# Patient Record
Sex: FEMALE | Race: ASIAN | NOT HISPANIC OR LATINO
[De-identification: names, ages, dates, MRNs, and addresses within clinical notes are randomized per-mention and may not be internally consistent; named-entity substitution may affect disease eponyms.]

---

## 2023-10-04 ENCOUNTER — APPOINTMENT (OUTPATIENT)
Dept: OBGYN | Facility: CLINIC | Age: 28
End: 2023-10-04
Payer: MEDICAID

## 2023-10-04 VITALS
WEIGHT: 102 LBS | HEIGHT: 62 IN | DIASTOLIC BLOOD PRESSURE: 73 MMHG | BODY MASS INDEX: 18.77 KG/M2 | SYSTOLIC BLOOD PRESSURE: 109 MMHG

## 2023-10-04 DIAGNOSIS — Z01.419 ENCOUNTER FOR GYNECOLOGICAL EXAMINATION (GENERAL) (ROUTINE) W/OUT ABNORMAL FINDINGS: ICD-10-CM

## 2023-10-04 DIAGNOSIS — N91.2 AMENORRHEA, UNSPECIFIED: ICD-10-CM

## 2023-10-04 PROBLEM — Z00.00 ENCOUNTER FOR PREVENTIVE HEALTH EXAMINATION: Status: ACTIVE | Noted: 2023-10-04

## 2023-10-04 PROCEDURE — 99385 PREV VISIT NEW AGE 18-39: CPT

## 2023-10-04 RX ORDER — ASCORBIC ACID, CHOLECALCIFEROL, .ALPHA.-TOCOPHEROL ACETATE, DL-, PYRIDOXINE, FOLIC ACID, CYANOCOBALAMIN, CALCIUM, FERROUS FUMARATE, MAGNESIUM, DOCONEXENT 85; 200; 10; 25; 1; 12; 140; 27; 45; 300 [IU]/1; [IU]/1; [IU]/1; [IU]/1; MG/1; UG/1; MG/1; MG/1; MG/1; MG/1
27-0.6-0.4-3 CAPSULE, GELATIN COATED ORAL
Qty: 60 | Refills: 3 | Status: ACTIVE | COMMUNITY
Start: 2023-10-04 | End: 1900-01-01

## 2023-10-04 RX ORDER — DOXYLAMINE SUCCINATE AND PYRIDOXINE HYDROCHLORIDE 10; 10 MG/1; MG/1
10-10 TABLET, DELAYED RELEASE ORAL
Qty: 30 | Refills: 2 | Status: ACTIVE | COMMUNITY
Start: 2023-10-04 | End: 1900-01-01

## 2023-10-08 LAB
ABO + RH PNL BLD: NORMAL
APPEARANCE: CLEAR
BACTERIA UR CULT: NORMAL
BACTERIA: NEGATIVE /HPF
BASOPHILS # BLD AUTO: 0.08 K/UL
BASOPHILS NFR BLD AUTO: 0.7 %
BILIRUBIN URINE: NEGATIVE
BLD GP AB SCN SERPL QL: NORMAL
BLOOD URINE: NEGATIVE
C TRACH RRNA SPEC QL NAA+PROBE: NOT DETECTED
CAST: 0 /LPF
COLOR: YELLOW
EOSINOPHIL # BLD AUTO: 0.32 K/UL
EOSINOPHIL NFR BLD AUTO: 2.9 %
EPITHELIAL CELLS: 9 /HPF
ESTIMATED AVERAGE GLUCOSE: 103 MG/DL
GLUCOSE QUALITATIVE U: NEGATIVE MG/DL
GLUCOSE SERPL-MCNC: 65 MG/DL
HBA1C MFR BLD HPLC: 5.2 %
HBV SURFACE AG SER QL: NONREACTIVE
HCT VFR BLD CALC: 35.4 %
HCV AB SER QL: NONREACTIVE
HCV S/CO RATIO: 0.06 S/CO
HGB A MFR BLD: 97.6 %
HGB A2 MFR BLD: 2.4 %
HGB BLD-MCNC: 11.1 G/DL
HGB FRACT BLD-IMP: NORMAL
HIV1+2 AB SPEC QL IA.RAPID: NONREACTIVE
HPV 16 E6+E7 MRNA CVX QL NAA+PROBE: NOT DETECTED
HPV HIGH+LOW RISK DNA PNL CVX: NOT DETECTED
HPV18+45 E6+E7 MRNA CVX QL NAA+PROBE: NOT DETECTED
IMM GRANULOCYTES NFR BLD AUTO: 0.4 %
KETONES URINE: NEGATIVE MG/DL
LEAD BLD-MCNC: 1 UG/DL
LEUKOCYTE ESTERASE URINE: ABNORMAL
LYMPHOCYTES # BLD AUTO: 2.62 K/UL
LYMPHOCYTES NFR BLD AUTO: 24.1 %
M TB IFN-G BLD-IMP: NEGATIVE
MAN DIFF?: NORMAL
MCHC RBC-ENTMCNC: 27.7 PG
MCHC RBC-ENTMCNC: 31.4 GM/DL
MCV RBC AUTO: 88.3 FL
MEV IGG FLD QL IA: >300 AU/ML
MEV IGG+IGM SER-IMP: POSITIVE
MICROSCOPIC-UA: NORMAL
MONOCYTES # BLD AUTO: 0.95 K/UL
MONOCYTES NFR BLD AUTO: 8.7 %
MUV AB SER-ACNC: NEGATIVE
MUV IGG SER QL IA: 7.2 AU/ML
N GONORRHOEA RRNA SPEC QL NAA+PROBE: NOT DETECTED
NEUTROPHILS # BLD AUTO: 6.88 K/UL
NEUTROPHILS NFR BLD AUTO: 63.2 %
NITRITE URINE: NEGATIVE
PH URINE: 7
PLATELET # BLD AUTO: 304 K/UL
PROTEIN URINE: NEGATIVE MG/DL
QUANTIFERON TB PLUS MITOGEN MINUS NIL: >10 IU/ML
QUANTIFERON TB PLUS NIL: 0.03 IU/ML
QUANTIFERON TB PLUS TB1 MINUS NIL: 0.01 IU/ML
QUANTIFERON TB PLUS TB2 MINUS NIL: 0 IU/ML
RBC # BLD: 4.01 M/UL
RBC # FLD: 13.5 %
RED BLOOD CELLS URINE: 0 /HPF
REVIEW: NORMAL
RUBV IGG FLD-ACNC: 3 INDEX
RUBV IGG SER-IMP: POSITIVE
SOURCE AMPLIFICATION: NORMAL
SPECIFIC GRAVITY URINE: 1.01
T PALLIDUM AB SER QL IA: NEGATIVE
T4 FREE SERPL-MCNC: 1.4 NG/DL
TSH SERPL-ACNC: 1.33 UIU/ML
UROBILINOGEN URINE: 0.2 MG/DL
WBC # FLD AUTO: 10.89 K/UL
WHITE BLOOD CELLS URINE: 1 /HPF

## 2023-10-10 LAB
AR GENE MUT ANL BLD/T: NORMAL
CFTR MUT TESTED BLD/T: NEGATIVE
CYTOLOGY CVX/VAG DOC THIN PREP: ABNORMAL

## 2023-10-12 LAB — FMR1 GENE MUT ANL BLD/T: NORMAL

## 2023-10-18 ENCOUNTER — APPOINTMENT (OUTPATIENT)
Dept: ANTEPARTUM | Facility: CLINIC | Age: 28
End: 2023-10-18
Payer: MEDICAID

## 2023-10-18 ENCOUNTER — ASOB RESULT (OUTPATIENT)
Age: 28
End: 2023-10-18

## 2023-10-18 ENCOUNTER — APPOINTMENT (OUTPATIENT)
Dept: OBGYN | Facility: CLINIC | Age: 28
End: 2023-10-18
Payer: MEDICAID

## 2023-10-18 VITALS
DIASTOLIC BLOOD PRESSURE: 72 MMHG | BODY MASS INDEX: 18.58 KG/M2 | HEIGHT: 62 IN | SYSTOLIC BLOOD PRESSURE: 109 MMHG | WEIGHT: 101 LBS

## 2023-10-18 PROCEDURE — 76815 OB US LIMITED FETUS(S): CPT

## 2023-10-18 PROCEDURE — 76801 OB US < 14 WKS SINGLE FETUS: CPT

## 2023-10-18 PROCEDURE — 0502F SUBSEQUENT PRENATAL CARE: CPT

## 2023-11-15 ENCOUNTER — APPOINTMENT (OUTPATIENT)
Dept: OBGYN | Facility: CLINIC | Age: 28
End: 2023-11-15

## 2023-11-15 ENCOUNTER — APPOINTMENT (OUTPATIENT)
Dept: ANTEPARTUM | Facility: CLINIC | Age: 28
End: 2023-11-15

## 2023-11-16 ENCOUNTER — APPOINTMENT (OUTPATIENT)
Dept: ANTEPARTUM | Facility: CLINIC | Age: 28
End: 2023-11-16
Payer: MEDICAID

## 2023-11-16 ENCOUNTER — LABORATORY RESULT (OUTPATIENT)
Age: 28
End: 2023-11-16

## 2023-11-16 ENCOUNTER — ASOB RESULT (OUTPATIENT)
Age: 28
End: 2023-11-16

## 2023-11-16 PROCEDURE — 76813 OB US NUCHAL MEAS 1 GEST: CPT

## 2023-11-16 PROCEDURE — 76801 OB US < 14 WKS SINGLE FETUS: CPT | Mod: 59

## 2023-11-27 ENCOUNTER — NON-APPOINTMENT (OUTPATIENT)
Age: 28
End: 2023-11-27

## 2023-11-30 ENCOUNTER — TRANSCRIPTION ENCOUNTER (OUTPATIENT)
Age: 28
End: 2023-11-30

## 2023-12-06 ENCOUNTER — APPOINTMENT (OUTPATIENT)
Dept: OBGYN | Facility: CLINIC | Age: 28
End: 2023-12-06
Payer: MEDICAID

## 2023-12-06 VITALS — SYSTOLIC BLOOD PRESSURE: 111 MMHG | BODY MASS INDEX: 19.2 KG/M2 | DIASTOLIC BLOOD PRESSURE: 75 MMHG | WEIGHT: 105 LBS

## 2023-12-06 DIAGNOSIS — Z3A.16 16 WEEKS GESTATION OF PREGNANCY: ICD-10-CM

## 2023-12-06 PROCEDURE — 0502F SUBSEQUENT PRENATAL CARE: CPT

## 2023-12-07 LAB
APPEARANCE: CLEAR
BACTERIA: NEGATIVE /HPF
BILIRUBIN URINE: NEGATIVE
BLOOD URINE: NEGATIVE
CAST: 0 /LPF
COLOR: YELLOW
EPITHELIAL CELLS: 2 /HPF
GLUCOSE QUALITATIVE U: NEGATIVE MG/DL
KETONES URINE: NEGATIVE MG/DL
LEUKOCYTE ESTERASE URINE: ABNORMAL
MICROSCOPIC-UA: NORMAL
NITRITE URINE: NEGATIVE
PH URINE: 6.5
PROTEIN URINE: NEGATIVE MG/DL
RED BLOOD CELLS URINE: 0 /HPF
SPECIFIC GRAVITY URINE: 1
UROBILINOGEN URINE: 0.2 MG/DL
WHITE BLOOD CELLS URINE: 3 /HPF

## 2023-12-09 LAB — BACTERIA UR CULT: NORMAL

## 2023-12-11 LAB
CHROMOSOME13 INTERPRETATION: NORMAL
CHROMOSOME13 TEST RESULT: NORMAL
CHROMOSOME18 INTERPRETATION: NORMAL
CHROMOSOME18 TEST RESULT: NORMAL
CHROMOSOME21 INTERPRETATION: NORMAL
CHROMOSOME21 TEST RESULT: NORMAL
FETAL FRACTION: NORMAL
PERFORMANCE AND LIMITATIONS: NORMAL
SEX CHROMOSOME INTERPRETATION: NORMAL
SEX CHROMOSOME TEST RESULT: NORMAL
VERIFI PRENATAL TEST: NOT DETECTED

## 2023-12-12 LAB
AFP MOM: 1.77
AFP VALUE: 82.7 NG/ML
ALPHA FETOPROTEIN SERUM COMMENT: NORMAL
ALPHA FETOPROTEIN SERUM INTERPRETATION: NORMAL
ALPHA FETOPROTEIN SERUM RESULTS: NORMAL
ALPHA FETOPROTEIN SERUM TEST RESULTS: NORMAL
GESTATIONAL AGE BASED ON: NORMAL
GESTATIONAL AGE ON COLLECTION DATE: 16.6 WEEKS
INSULIN DEP DIABETES: NO
MATERNAL AGE AT EDD AFP: 29.3 YR
MULTIPLE GESTATION: NO
OSBR RISK 1 IN: 1373
RACE: NORMAL
WEIGHT AFP: 105 LBS

## 2023-12-27 ENCOUNTER — NON-APPOINTMENT (OUTPATIENT)
Age: 28
End: 2023-12-27

## 2023-12-28 ENCOUNTER — ASOB RESULT (OUTPATIENT)
Age: 28
End: 2023-12-28

## 2023-12-28 ENCOUNTER — APPOINTMENT (OUTPATIENT)
Dept: MATERNAL FETAL MEDICINE | Facility: CLINIC | Age: 28
End: 2023-12-28
Payer: MEDICAID

## 2023-12-28 PROCEDURE — 99202 OFFICE O/P NEW SF 15 MIN: CPT | Mod: 95

## 2024-01-02 ENCOUNTER — APPOINTMENT (OUTPATIENT)
Dept: ANTEPARTUM | Facility: CLINIC | Age: 29
End: 2024-01-02

## 2024-01-08 ENCOUNTER — APPOINTMENT (OUTPATIENT)
Dept: ANTEPARTUM | Facility: CLINIC | Age: 29
End: 2024-01-08
Payer: MEDICAID

## 2024-01-08 ENCOUNTER — ASOB RESULT (OUTPATIENT)
Age: 29
End: 2024-01-08

## 2024-01-08 ENCOUNTER — APPOINTMENT (OUTPATIENT)
Dept: OBGYN | Facility: CLINIC | Age: 29
End: 2024-01-08
Payer: COMMERCIAL

## 2024-01-08 VITALS — BODY MASS INDEX: 19.75 KG/M2 | SYSTOLIC BLOOD PRESSURE: 102 MMHG | WEIGHT: 108 LBS | DIASTOLIC BLOOD PRESSURE: 66 MMHG

## 2024-01-08 PROCEDURE — 0502F SUBSEQUENT PRENATAL CARE: CPT

## 2024-01-08 PROCEDURE — 76811 OB US DETAILED SNGL FETUS: CPT

## 2024-01-24 ENCOUNTER — LABORATORY RESULT (OUTPATIENT)
Age: 29
End: 2024-01-24

## 2024-01-24 ENCOUNTER — APPOINTMENT (OUTPATIENT)
Dept: OBGYN | Facility: CLINIC | Age: 29
End: 2024-01-24
Payer: COMMERCIAL

## 2024-01-24 ENCOUNTER — ASOB RESULT (OUTPATIENT)
Age: 29
End: 2024-01-24

## 2024-01-24 ENCOUNTER — APPOINTMENT (OUTPATIENT)
Dept: ANTEPARTUM | Facility: CLINIC | Age: 29
End: 2024-01-24
Payer: MEDICAID

## 2024-01-24 VITALS — SYSTOLIC BLOOD PRESSURE: 111 MMHG | WEIGHT: 110 LBS | DIASTOLIC BLOOD PRESSURE: 80 MMHG | BODY MASS INDEX: 20.12 KG/M2

## 2024-01-24 PROCEDURE — ZZZZZ: CPT

## 2024-01-24 PROCEDURE — 76816 OB US FOLLOW-UP PER FETUS: CPT

## 2024-01-24 PROCEDURE — 59000 AMNIOCENTESIS DIAGNOSTIC: CPT

## 2024-02-05 ENCOUNTER — APPOINTMENT (OUTPATIENT)
Dept: OBGYN | Facility: CLINIC | Age: 29
End: 2024-02-05
Payer: COMMERCIAL

## 2024-02-05 VITALS — WEIGHT: 113 LBS | SYSTOLIC BLOOD PRESSURE: 110 MMHG | DIASTOLIC BLOOD PRESSURE: 73 MMHG | BODY MASS INDEX: 20.67 KG/M2

## 2024-02-05 PROCEDURE — 0502F SUBSEQUENT PRENATAL CARE: CPT

## 2024-02-06 LAB
BASOPHILS # BLD AUTO: 0.05 K/UL
BASOPHILS NFR BLD AUTO: 0.5 %
EOSINOPHIL # BLD AUTO: 0.1 K/UL
EOSINOPHIL NFR BLD AUTO: 0.9 %
GLUCOSE 1H P 50 G GLC PO SERPL-MCNC: 124 MG/DL
HCT VFR BLD CALC: 32.3 %
HGB BLD-MCNC: 10 G/DL
IMM GRANULOCYTES NFR BLD AUTO: 1.7 %
LYMPHOCYTES # BLD AUTO: 2.84 K/UL
LYMPHOCYTES NFR BLD AUTO: 26.6 %
MAN DIFF?: NORMAL
MCHC RBC-ENTMCNC: 28 PG
MCHC RBC-ENTMCNC: 31 GM/DL
MCV RBC AUTO: 90.5 FL
MONOCYTES # BLD AUTO: 0.91 K/UL
MONOCYTES NFR BLD AUTO: 8.5 %
NEUTROPHILS # BLD AUTO: 6.58 K/UL
NEUTROPHILS NFR BLD AUTO: 61.8 %
PLATELET # BLD AUTO: 309 K/UL
RBC # BLD: 3.57 M/UL
RBC # FLD: 13.6 %
WBC # FLD AUTO: 10.66 K/UL

## 2024-02-06 RX ORDER — FERROUS SULFATE 325(65) MG
325 (65 FE) TABLET ORAL DAILY
Qty: 30 | Refills: 6 | Status: ACTIVE | COMMUNITY
Start: 2024-02-06 | End: 1900-01-01

## 2024-02-10 ENCOUNTER — APPOINTMENT (OUTPATIENT)
Dept: ANTEPARTUM | Facility: CLINIC | Age: 29
End: 2024-02-10

## 2024-02-10 ENCOUNTER — OUTPATIENT (OUTPATIENT)
Dept: INPATIENT UNIT | Facility: HOSPITAL | Age: 29
LOS: 1 days | Discharge: HOME | End: 2024-02-10
Payer: COMMERCIAL

## 2024-02-10 VITALS
RESPIRATION RATE: 16 BRPM | OXYGEN SATURATION: 100 % | TEMPERATURE: 99 F | HEART RATE: 92 BPM | SYSTOLIC BLOOD PRESSURE: 113 MMHG | DIASTOLIC BLOOD PRESSURE: 60 MMHG

## 2024-02-10 VITALS — DIASTOLIC BLOOD PRESSURE: 62 MMHG | SYSTOLIC BLOOD PRESSURE: 99 MMHG | HEART RATE: 85 BPM

## 2024-02-10 DIAGNOSIS — O26.899 OTHER SPECIFIED PREGNANCY RELATED CONDITIONS, UNSPECIFIED TRIMESTER: ICD-10-CM

## 2024-02-10 LAB
APPEARANCE UR: ABNORMAL
BACTERIA # UR AUTO: ABNORMAL /HPF
BILIRUB UR-MCNC: NEGATIVE — SIGNIFICANT CHANGE UP
CAST: 0 /LPF — SIGNIFICANT CHANGE UP (ref 0–4)
COLOR SPEC: YELLOW — SIGNIFICANT CHANGE UP
DIFF PNL FLD: ABNORMAL
GLUCOSE UR QL: NEGATIVE MG/DL — SIGNIFICANT CHANGE UP
KETONES UR-MCNC: NEGATIVE MG/DL — SIGNIFICANT CHANGE UP
LEUKOCYTE ESTERASE UR-ACNC: ABNORMAL
NITRITE UR-MCNC: NEGATIVE — SIGNIFICANT CHANGE UP
PH UR: 6.5 — SIGNIFICANT CHANGE UP (ref 5–8)
PROT UR-MCNC: NEGATIVE MG/DL — SIGNIFICANT CHANGE UP
RBC CASTS # UR COMP ASSIST: 0 /HPF — SIGNIFICANT CHANGE UP (ref 0–4)
REVIEW: SIGNIFICANT CHANGE UP
SP GR SPEC: 1.01 — SIGNIFICANT CHANGE UP (ref 1–1.03)
SQUAMOUS # UR AUTO: 10 /HPF — HIGH (ref 0–5)
UROBILINOGEN FLD QL: 0.2 MG/DL — SIGNIFICANT CHANGE UP (ref 0.2–1)
WBC UR QL: 24 /HPF — HIGH (ref 0–5)

## 2024-02-10 PROCEDURE — 59025 FETAL NON-STRESS TEST: CPT | Mod: 26

## 2024-02-10 PROCEDURE — 99221 1ST HOSP IP/OBS SF/LOW 40: CPT | Mod: 25

## 2024-02-10 NOTE — OB PROVIDER TRIAGE NOTE - PLAN OF CARE
no acute pathology  on exam  cx closed no  active  VB on exam    normal cx length and  FHR reassuring

## 2024-02-10 NOTE — OB RN TRIAGE NOTE - CURRENT PREGNANCY COMPLICATIONS, OB PROFILE
Gestational Age less than 36 Weeks s/p amniocentesis 2/5/2024 baby small stomach/Gestational Age less than 36 Weeks/Other

## 2024-02-10 NOTE — OB PROVIDER TRIAGE NOTE - NSOBPROVIDERNOTE_OBGYN_ALL_OB_FT
28 year old female P0 at 26weeks VB    no VB    noted on exam  cx closed    no acute pathology noted  on exam     Blood type  O positive      case d/w Dr Jiang    cleared for discharge     instructions given     office follow up as scheduled    call if s/s worsens  or heavier VB or contractions     FM counts reviewed   s/s of PTL reviewed 28 year old female P0 at 26weeks VB    no VB    noted on exam  cx closed    no acute pathology noted  on exam     Blood type  O positive      case d/w Dr Jiang    cleared for discharge     instructions given     office follow up as scheduled    call if s/s worsens  or heavier VB or contractions     FM counts reviewed   s/s of PTL reviewed  Discharge time  5995p 28 year old female P0 at 26weeks VB    no VB    noted on exam  cx closed    no acute pathology noted  on exam     Blood type  O positive      case d/w Dr Jiang    cleared for discharge     instructions given     office follow up as scheduled    call if s/s worsens  or heavier VB or contractions     FM counts reviewed   s/s of PTL reviewed  Discharge time  0275p

## 2024-02-10 NOTE — OB PROVIDER TRIAGE NOTE - HISTORY OF PRESENT ILLNESS
28 year old female P0 at 26 weeks gestation who noted VB spotting this am     noted after wiping after urination  denied any fever chills dysuria back pains    stated  had intercourse on 2/8 and  also had Amniocentesis done 2/5 for small stomach noted on Anatomy scan     [facility]  DR Jiang   followed for small stomach  otherwise  normal anatomy          28 year old female P0 at 26 weeks gestation who noted VB spotting this am     noted after wiping after urination  denied any fever chills dysuria back pains    stated  had intercourse on 2/8 and  also had Amniocentesis done 2/5 for small stomach noted on Anatomy scan   denied  any abd pains contractions  or cramping       [facility]  DR Jiang   followed for small stomach  otherwise  normal anatomy

## 2024-02-10 NOTE — OB PROVIDER TRIAGE NOTE - NSHPPHYSICALEXAM_GEN_ALL_CORE
pt seen and examined   ICU Vital Signs Last 24 Hrs  T(C): 37 (10 Feb 2024 14:36), Max: 37 (10 Feb 2024 14:36)  T(F): 98.6 (10 Feb 2024 14:36), Max: 98.6 (10 Feb 2024 14:36)  HR: 85 (10 Feb 2024 16:13) (85 - 92)  BP: 99/62 (10 Feb 2024 16:13) (99/62 - 113/60)  BP(mean): --  ABP: --  ABP(mean): --  RR: 16 (10 Feb 2024 14:36) (16 - 16)  SpO2: 100% (10 Feb 2024 14:36) (100% - 100%)    pt in NAD   lungs clear   heart s1 s2  abd soft gravid non tender   placed on EFM    NST reactive   moderate variability  acceleration  150   irritability noted    not felt by patient   SSE  dark brown discharge and  yeast noted     cx closed  friable appearing cx   no active VB    TVS Cx Length 3.8 cm no previa   Abd  scan vertex  AAFI MVP> 3  positive FHR FM noted  M mode 135    images saved to ASOB

## 2024-02-11 LAB
CULTURE RESULTS: SIGNIFICANT CHANGE UP
SPECIMEN SOURCE: SIGNIFICANT CHANGE UP

## 2024-02-12 DIAGNOSIS — Z3A.26 26 WEEKS GESTATION OF PREGNANCY: ICD-10-CM

## 2024-02-12 DIAGNOSIS — O26.852 SPOTTING COMPLICATING PREGNANCY, SECOND TRIMESTER: ICD-10-CM

## 2024-02-21 ENCOUNTER — APPOINTMENT (OUTPATIENT)
Dept: OBGYN | Facility: CLINIC | Age: 29
End: 2024-02-21
Payer: COMMERCIAL

## 2024-02-21 VITALS — WEIGHT: 119 LBS | SYSTOLIC BLOOD PRESSURE: 112 MMHG | DIASTOLIC BLOOD PRESSURE: 76 MMHG | BODY MASS INDEX: 21.77 KG/M2

## 2024-02-21 PROCEDURE — 0502F SUBSEQUENT PRENATAL CARE: CPT

## 2024-02-21 PROCEDURE — 90715 TDAP VACCINE 7 YRS/> IM: CPT

## 2024-02-21 PROCEDURE — 90471 IMMUNIZATION ADMIN: CPT

## 2024-02-22 PROBLEM — Z78.9 OTHER SPECIFIED HEALTH STATUS: Chronic | Status: ACTIVE | Noted: 2024-02-10

## 2024-03-11 ENCOUNTER — APPOINTMENT (OUTPATIENT)
Dept: ANTEPARTUM | Facility: CLINIC | Age: 29
End: 2024-03-11
Payer: COMMERCIAL

## 2024-03-11 ENCOUNTER — ASOB RESULT (OUTPATIENT)
Age: 29
End: 2024-03-11

## 2024-03-11 ENCOUNTER — APPOINTMENT (OUTPATIENT)
Dept: OBGYN | Facility: CLINIC | Age: 29
End: 2024-03-11
Payer: COMMERCIAL

## 2024-03-11 VITALS — WEIGHT: 121 LBS | SYSTOLIC BLOOD PRESSURE: 111 MMHG | DIASTOLIC BLOOD PRESSURE: 77 MMHG | BODY MASS INDEX: 22.13 KG/M2

## 2024-03-11 PROCEDURE — 76816 OB US FOLLOW-UP PER FETUS: CPT

## 2024-03-11 PROCEDURE — 76818 FETAL BIOPHYS PROFILE W/NST: CPT | Mod: 59

## 2024-03-11 PROCEDURE — 0502F SUBSEQUENT PRENATAL CARE: CPT

## 2024-03-18 ENCOUNTER — APPOINTMENT (OUTPATIENT)
Dept: OBGYN | Facility: CLINIC | Age: 29
End: 2024-03-18
Payer: COMMERCIAL

## 2024-03-18 ENCOUNTER — ASOB RESULT (OUTPATIENT)
Age: 29
End: 2024-03-18

## 2024-03-18 ENCOUNTER — APPOINTMENT (OUTPATIENT)
Dept: ANTEPARTUM | Facility: CLINIC | Age: 29
End: 2024-03-18
Payer: COMMERCIAL

## 2024-03-18 VITALS — WEIGHT: 125 LBS | SYSTOLIC BLOOD PRESSURE: 131 MMHG | BODY MASS INDEX: 22.86 KG/M2 | DIASTOLIC BLOOD PRESSURE: 83 MMHG

## 2024-03-18 DIAGNOSIS — O24.419 GESTATIONAL DIABETES MELLITUS IN PREGNANCY, UNSPECIFIED CONTROL: ICD-10-CM

## 2024-03-18 PROCEDURE — 0502F SUBSEQUENT PRENATAL CARE: CPT

## 2024-03-18 PROCEDURE — 76819 FETAL BIOPHYS PROFIL W/O NST: CPT

## 2024-03-18 RX ORDER — LANCETS
EACH MISCELLANEOUS
Qty: 2 | Refills: 3 | Status: ACTIVE | COMMUNITY
Start: 2024-03-18 | End: 1900-01-01

## 2024-03-18 RX ORDER — BLOOD-GLUCOSE METER
W/DEVICE KIT MISCELLANEOUS
Qty: 1 | Refills: 0 | Status: ACTIVE | COMMUNITY
Start: 2024-03-18 | End: 1900-01-01

## 2024-03-18 RX ORDER — BLOOD-GLUCOSE METER
70 EACH MISCELLANEOUS
Qty: 2 | Refills: 2 | Status: ACTIVE | COMMUNITY
Start: 2024-03-18 | End: 1900-01-01

## 2024-03-18 RX ORDER — BLOOD SUGAR DIAGNOSTIC
STRIP MISCELLANEOUS
Qty: 2 | Refills: 2 | Status: ACTIVE | COMMUNITY
Start: 2024-03-18 | End: 1900-01-01

## 2024-04-03 ENCOUNTER — APPOINTMENT (OUTPATIENT)
Dept: OBGYN | Facility: CLINIC | Age: 29
End: 2024-04-03
Payer: COMMERCIAL

## 2024-04-03 VITALS — BODY MASS INDEX: 23.23 KG/M2 | DIASTOLIC BLOOD PRESSURE: 84 MMHG | SYSTOLIC BLOOD PRESSURE: 120 MMHG | WEIGHT: 127 LBS

## 2024-04-03 PROCEDURE — 0502F SUBSEQUENT PRENATAL CARE: CPT

## 2024-04-15 ENCOUNTER — APPOINTMENT (OUTPATIENT)
Dept: ANTEPARTUM | Facility: CLINIC | Age: 29
End: 2024-04-15
Payer: COMMERCIAL

## 2024-04-15 ENCOUNTER — LABORATORY RESULT (OUTPATIENT)
Age: 29
End: 2024-04-15

## 2024-04-15 ENCOUNTER — ASOB RESULT (OUTPATIENT)
Age: 29
End: 2024-04-15

## 2024-04-15 ENCOUNTER — APPOINTMENT (OUTPATIENT)
Dept: OBGYN | Facility: CLINIC | Age: 29
End: 2024-04-15
Payer: COMMERCIAL

## 2024-04-15 VITALS
WEIGHT: 126 LBS | HEIGHT: 62 IN | DIASTOLIC BLOOD PRESSURE: 84 MMHG | BODY MASS INDEX: 23.19 KG/M2 | SYSTOLIC BLOOD PRESSURE: 120 MMHG

## 2024-04-15 PROCEDURE — 0502F SUBSEQUENT PRENATAL CARE: CPT

## 2024-04-15 PROCEDURE — 76816 OB US FOLLOW-UP PER FETUS: CPT

## 2024-04-15 PROCEDURE — 76818 FETAL BIOPHYS PROFILE W/NST: CPT | Mod: 59

## 2024-04-18 LAB
ALBUMIN SERPL ELPH-MCNC: 3.5 G/DL
ALP BLD-CCNC: 151 U/L
ALT SERPL-CCNC: 55 U/L
ANION GAP SERPL CALC-SCNC: 13 MMOL/L
AST SERPL-CCNC: 35 U/L
BILE AC SER-MCNC: 5.1 UMOL/L
BILIRUB SERPL-MCNC: 0.2 MG/DL
BUN SERPL-MCNC: 7 MG/DL
CALCIUM SERPL-MCNC: 9 MG/DL
CHLORIDE SERPL-SCNC: 101 MMOL/L
CO2 SERPL-SCNC: 20 MMOL/L
CREAT SERPL-MCNC: 0.6 MG/DL
EGFR: 125 ML/MIN/1.73M2
GLUCOSE SERPL-MCNC: 78 MG/DL
POTASSIUM SERPL-SCNC: 4.1 MMOL/L
PROT SERPL-MCNC: 6.2 G/DL
SODIUM SERPL-SCNC: 135 MMOL/L

## 2024-04-22 ENCOUNTER — ASOB RESULT (OUTPATIENT)
Age: 29
End: 2024-04-22

## 2024-04-22 ENCOUNTER — APPOINTMENT (OUTPATIENT)
Dept: ANTEPARTUM | Facility: CLINIC | Age: 29
End: 2024-04-22
Payer: COMMERCIAL

## 2024-04-22 ENCOUNTER — APPOINTMENT (OUTPATIENT)
Dept: OBGYN | Facility: CLINIC | Age: 29
End: 2024-04-22
Payer: COMMERCIAL

## 2024-04-22 VITALS
BODY MASS INDEX: 23.55 KG/M2 | WEIGHT: 128 LBS | HEIGHT: 62 IN | DIASTOLIC BLOOD PRESSURE: 84 MMHG | SYSTOLIC BLOOD PRESSURE: 129 MMHG

## 2024-04-22 PROCEDURE — 0502F SUBSEQUENT PRENATAL CARE: CPT

## 2024-04-22 PROCEDURE — 76818 FETAL BIOPHYS PROFILE W/NST: CPT

## 2024-04-23 ENCOUNTER — NON-APPOINTMENT (OUTPATIENT)
Age: 29
End: 2024-04-23

## 2024-04-23 LAB
BASOPHILS # BLD AUTO: 0.05 K/UL
BASOPHILS NFR BLD AUTO: 0.5 %
EOSINOPHIL # BLD AUTO: 0.12 K/UL
EOSINOPHIL NFR BLD AUTO: 1.1 %
HCT VFR BLD CALC: 37.3 %
HGB BLD-MCNC: 11.7 G/DL
HIV1+2 AB SPEC QL IA.RAPID: NONREACTIVE
IMM GRANULOCYTES NFR BLD AUTO: 2.7 %
LYMPHOCYTES # BLD AUTO: 2.62 K/UL
LYMPHOCYTES NFR BLD AUTO: 24.7 %
MAN DIFF?: NORMAL
MCHC RBC-ENTMCNC: 28.6 PG
MCHC RBC-ENTMCNC: 31.4 GM/DL
MCV RBC AUTO: 91.2 FL
MONOCYTES # BLD AUTO: 1.02 K/UL
MONOCYTES NFR BLD AUTO: 9.6 %
NEUTROPHILS # BLD AUTO: 6.49 K/UL
NEUTROPHILS NFR BLD AUTO: 61.4 %
PLATELET # BLD AUTO: 285 K/UL
RBC # BLD: 4.09 M/UL
RBC # FLD: 14.1 %
WBC # FLD AUTO: 10.59 K/UL

## 2024-04-27 LAB — B-HEM STREP SPEC QL CULT: NORMAL

## 2024-04-29 ENCOUNTER — APPOINTMENT (OUTPATIENT)
Dept: OBGYN | Facility: CLINIC | Age: 29
End: 2024-04-29
Payer: COMMERCIAL

## 2024-04-29 ENCOUNTER — LABORATORY RESULT (OUTPATIENT)
Age: 29
End: 2024-04-29

## 2024-04-29 VITALS — DIASTOLIC BLOOD PRESSURE: 84 MMHG | BODY MASS INDEX: 23.59 KG/M2 | WEIGHT: 129 LBS | SYSTOLIC BLOOD PRESSURE: 126 MMHG

## 2024-04-29 PROCEDURE — 0502F SUBSEQUENT PRENATAL CARE: CPT

## 2024-05-01 ENCOUNTER — NON-APPOINTMENT (OUTPATIENT)
Age: 29
End: 2024-05-01

## 2024-05-01 LAB
ALBUMIN SERPL ELPH-MCNC: 3.6 G/DL
ALBUMIN SERPL ELPH-MCNC: 3.6 G/DL
ALP BLD-CCNC: 164 U/L
ALP BLD-CCNC: 178 U/L
ALT SERPL-CCNC: 55 U/L
ALT SERPL-CCNC: 64 U/L
ANION GAP SERPL CALC-SCNC: 13 MMOL/L
ANION GAP SERPL CALC-SCNC: 14 MMOL/L
AST SERPL-CCNC: 38 U/L
AST SERPL-CCNC: 44 U/L
BILE AC SER-MCNC: 5.5 UMOL/L
BILIRUB SERPL-MCNC: 0.2 MG/DL
BILIRUB SERPL-MCNC: <0.2 MG/DL
BUN SERPL-MCNC: 6 MG/DL
BUN SERPL-MCNC: 8 MG/DL
C3 SERPL-MCNC: 188 MG/DL
C4 SERPL-MCNC: 33 MG/DL
CALCIUM SERPL-MCNC: 9.1 MG/DL
CALCIUM SERPL-MCNC: 9.4 MG/DL
CHLORIDE SERPL-SCNC: 101 MMOL/L
CHLORIDE SERPL-SCNC: 104 MMOL/L
CMV IGG SERPL QL: 7.7 U/ML
CMV IGG SERPL-IMP: POSITIVE
CMV IGM SERPL QL: <8 AU/ML
CMV IGM SERPL QL: NEGATIVE
CO2 SERPL-SCNC: 21 MMOL/L
CO2 SERPL-SCNC: 23 MMOL/L
CREAT SERPL-MCNC: 0.62 MG/DL
CREAT SERPL-MCNC: 0.65 MG/DL
EGFR: 122 ML/MIN/1.73M2
EGFR: 124 ML/MIN/1.73M2
GLUCOSE SERPL-MCNC: 77 MG/DL
GLUCOSE SERPL-MCNC: 88 MG/DL
POTASSIUM SERPL-SCNC: 4.1 MMOL/L
POTASSIUM SERPL-SCNC: 4.2 MMOL/L
PROT SERPL-MCNC: 6.4 G/DL
PROT SERPL-MCNC: 6.5 G/DL
SODIUM SERPL-SCNC: 136 MMOL/L
SODIUM SERPL-SCNC: 139 MMOL/L

## 2024-05-03 ENCOUNTER — OUTPATIENT (OUTPATIENT)
Dept: OUTPATIENT SERVICES | Facility: HOSPITAL | Age: 29
LOS: 1 days | End: 2024-05-03

## 2024-05-06 ENCOUNTER — APPOINTMENT (OUTPATIENT)
Dept: OBGYN | Facility: CLINIC | Age: 29
End: 2024-05-06
Payer: COMMERCIAL

## 2024-05-06 ENCOUNTER — APPOINTMENT (OUTPATIENT)
Dept: ANTEPARTUM | Facility: CLINIC | Age: 29
End: 2024-05-06
Payer: COMMERCIAL

## 2024-05-06 ENCOUNTER — ASOB RESULT (OUTPATIENT)
Age: 29
End: 2024-05-06

## 2024-05-06 VITALS
SYSTOLIC BLOOD PRESSURE: 130 MMHG | WEIGHT: 129 LBS | HEIGHT: 62 IN | BODY MASS INDEX: 23.74 KG/M2 | DIASTOLIC BLOOD PRESSURE: 86 MMHG

## 2024-05-06 DIAGNOSIS — Z34.90 ENCOUNTER FOR SUPERVISION OF NORMAL PREGNANCY, UNSPECIFIED, UNSPECIFIED TRIMESTER: ICD-10-CM

## 2024-05-06 PROCEDURE — 0502F SUBSEQUENT PRENATAL CARE: CPT

## 2024-05-06 PROCEDURE — 76816 OB US FOLLOW-UP PER FETUS: CPT

## 2024-05-06 PROCEDURE — 76818 FETAL BIOPHYS PROFILE W/NST: CPT | Mod: 59

## 2024-05-06 RX ORDER — TERCONAZOLE 4 MG/G
0.4 CREAM VAGINAL
Qty: 1 | Refills: 0 | Status: ACTIVE | COMMUNITY
Start: 2024-05-06 | End: 1900-01-01

## 2024-05-08 LAB
CANDIDA VAG CYTO: DETECTED
G VAGINALIS+PREV SP MTYP VAG QL MICRO: DETECTED
T VAGINALIS VAG QL WET PREP: NOT DETECTED

## 2024-05-08 RX ORDER — METRONIDAZOLE 7.5 MG/G
0.75 GEL VAGINAL
Qty: 1 | Refills: 0 | Status: COMPLETED | OUTPATIENT
Start: 2024-05-08 | End: 2024-05-13

## 2024-05-13 ENCOUNTER — NON-APPOINTMENT (OUTPATIENT)
Age: 29
End: 2024-05-13

## 2024-05-13 ENCOUNTER — APPOINTMENT (OUTPATIENT)
Dept: ANTEPARTUM | Facility: CLINIC | Age: 29
End: 2024-05-13
Payer: COMMERCIAL

## 2024-05-13 ENCOUNTER — APPOINTMENT (OUTPATIENT)
Dept: OBGYN | Facility: CLINIC | Age: 29
End: 2024-05-13
Payer: COMMERCIAL

## 2024-05-13 ENCOUNTER — ASOB RESULT (OUTPATIENT)
Age: 29
End: 2024-05-13

## 2024-05-13 VITALS
BODY MASS INDEX: 23.55 KG/M2 | HEIGHT: 62 IN | SYSTOLIC BLOOD PRESSURE: 133 MMHG | DIASTOLIC BLOOD PRESSURE: 87 MMHG | WEIGHT: 128 LBS

## 2024-05-13 PROCEDURE — 59426 ANTEPARTUM CARE ONLY: CPT

## 2024-05-13 PROCEDURE — 76818 FETAL BIOPHYS PROFILE W/NST: CPT

## 2024-05-13 PROCEDURE — 0502F SUBSEQUENT PRENATAL CARE: CPT

## 2024-05-14 ENCOUNTER — NON-APPOINTMENT (OUTPATIENT)
Age: 29
End: 2024-05-14

## 2024-05-20 ENCOUNTER — APPOINTMENT (OUTPATIENT)
Dept: ANTEPARTUM | Facility: CLINIC | Age: 29
End: 2024-05-20

## 2024-05-20 ENCOUNTER — TRANSCRIPTION ENCOUNTER (OUTPATIENT)
Age: 29
End: 2024-05-20

## 2024-05-20 ENCOUNTER — APPOINTMENT (OUTPATIENT)
Dept: OBGYN | Facility: CLINIC | Age: 29
End: 2024-05-20

## 2024-05-20 ENCOUNTER — INPATIENT (INPATIENT)
Facility: HOSPITAL | Age: 29
LOS: 3 days | Discharge: HOME | End: 2024-05-24
Attending: OBSTETRICS & GYNECOLOGY | Admitting: OBSTETRICS & GYNECOLOGY
Payer: COMMERCIAL

## 2024-05-20 VITALS — TEMPERATURE: 98 F | RESPIRATION RATE: 14 BRPM

## 2024-05-20 LAB
BASOPHILS # BLD AUTO: 0.07 K/UL — SIGNIFICANT CHANGE UP (ref 0–0.2)
BASOPHILS NFR BLD AUTO: 0.6 % — SIGNIFICANT CHANGE UP (ref 0–2)
BLD GP AB SCN SERPL QL: NEGATIVE — SIGNIFICANT CHANGE UP
EOSINOPHIL # BLD AUTO: 0.16 K/UL — SIGNIFICANT CHANGE UP (ref 0–0.5)
EOSINOPHIL NFR BLD AUTO: 1.3 % — SIGNIFICANT CHANGE UP (ref 0–6)
GLUCOSE BLDC GLUCOMTR-MCNC: 73 MG/DL — SIGNIFICANT CHANGE UP (ref 70–99)
GLUCOSE BLDC GLUCOMTR-MCNC: 83 MG/DL — SIGNIFICANT CHANGE UP (ref 70–99)
GLUCOSE BLDC GLUCOMTR-MCNC: 87 MG/DL — SIGNIFICANT CHANGE UP (ref 70–99)
GLUCOSE BLDC GLUCOMTR-MCNC: 94 MG/DL — SIGNIFICANT CHANGE UP (ref 70–99)
GLUCOSE BLDC GLUCOMTR-MCNC: 95 MG/DL — SIGNIFICANT CHANGE UP (ref 70–99)
GLUCOSE BLDC GLUCOMTR-MCNC: 98 MG/DL — SIGNIFICANT CHANGE UP (ref 70–99)
HCT VFR BLD CALC: 36.4 % — SIGNIFICANT CHANGE UP (ref 34.5–45)
HGB BLD-MCNC: 12.3 G/DL — SIGNIFICANT CHANGE UP (ref 11.5–15.5)
IANC: 7.51 K/UL — HIGH (ref 1.8–7.4)
IMM GRANULOCYTES NFR BLD AUTO: 1.7 % — HIGH (ref 0–0.9)
LYMPHOCYTES # BLD AUTO: 2.95 K/UL — SIGNIFICANT CHANGE UP (ref 1–3.3)
LYMPHOCYTES # BLD AUTO: 24.7 % — SIGNIFICANT CHANGE UP (ref 13–44)
MCHC RBC-ENTMCNC: 28.7 PG — SIGNIFICANT CHANGE UP (ref 27–34)
MCHC RBC-ENTMCNC: 33.8 GM/DL — SIGNIFICANT CHANGE UP (ref 32–36)
MCV RBC AUTO: 85 FL — SIGNIFICANT CHANGE UP (ref 80–100)
MONOCYTES # BLD AUTO: 1.04 K/UL — HIGH (ref 0–0.9)
MONOCYTES NFR BLD AUTO: 8.7 % — SIGNIFICANT CHANGE UP (ref 2–14)
NEUTROPHILS # BLD AUTO: 7.51 K/UL — HIGH (ref 1.8–7.4)
NEUTROPHILS NFR BLD AUTO: 63 % — SIGNIFICANT CHANGE UP (ref 43–77)
NRBC # BLD: 0 /100 WBCS — SIGNIFICANT CHANGE UP (ref 0–0)
NRBC # FLD: 0 K/UL — SIGNIFICANT CHANGE UP (ref 0–0)
PLATELET # BLD AUTO: 278 K/UL — SIGNIFICANT CHANGE UP (ref 150–400)
RBC # BLD: 4.28 M/UL — SIGNIFICANT CHANGE UP (ref 3.8–5.2)
RBC # FLD: 13.2 % — SIGNIFICANT CHANGE UP (ref 10.3–14.5)
RH IG SCN BLD-IMP: POSITIVE — SIGNIFICANT CHANGE UP
RH IG SCN BLD-IMP: POSITIVE — SIGNIFICANT CHANGE UP
WBC # BLD: 11.93 K/UL — HIGH (ref 3.8–10.5)
WBC # FLD AUTO: 11.93 K/UL — HIGH (ref 3.8–10.5)

## 2024-05-20 RX ORDER — SODIUM CHLORIDE 9 MG/ML
1000 INJECTION, SOLUTION INTRAVENOUS
Refills: 0 | Status: DISCONTINUED | OUTPATIENT
Start: 2024-05-20 | End: 2024-05-21

## 2024-05-20 RX ORDER — SODIUM CHLORIDE 9 MG/ML
1000 INJECTION INTRAMUSCULAR; INTRAVENOUS; SUBCUTANEOUS
Refills: 0 | Status: DISCONTINUED | OUTPATIENT
Start: 2024-05-20 | End: 2024-05-20

## 2024-05-20 RX ORDER — SODIUM CHLORIDE 9 MG/ML
1000 INJECTION INTRAMUSCULAR; INTRAVENOUS; SUBCUTANEOUS
Refills: 0 | Status: DISCONTINUED | OUTPATIENT
Start: 2024-05-20 | End: 2024-05-21

## 2024-05-20 RX ORDER — CHLORHEXIDINE GLUCONATE 213 G/1000ML
1 SOLUTION TOPICAL DAILY
Refills: 0 | Status: DISCONTINUED | OUTPATIENT
Start: 2024-05-20 | End: 2024-05-21

## 2024-05-20 RX ORDER — SODIUM CHLORIDE 9 MG/ML
500 INJECTION, SOLUTION INTRAVENOUS ONCE
Refills: 0 | Status: DISCONTINUED | OUTPATIENT
Start: 2024-05-20 | End: 2024-05-21

## 2024-05-20 RX ORDER — SODIUM CHLORIDE 9 MG/ML
300 INJECTION INTRAMUSCULAR; INTRAVENOUS; SUBCUTANEOUS ONCE
Refills: 0 | Status: DISCONTINUED | OUTPATIENT
Start: 2024-05-20 | End: 2024-05-21

## 2024-05-20 RX ORDER — SODIUM CHLORIDE 9 MG/ML
1000 INJECTION, SOLUTION INTRAVENOUS
Refills: 0 | Status: DISCONTINUED | OUTPATIENT
Start: 2024-05-20 | End: 2024-05-20

## 2024-05-20 RX ORDER — OXYTOCIN 10 UNIT/ML
VIAL (ML) INJECTION
Qty: 30 | Refills: 0 | Status: DISCONTINUED | OUTPATIENT
Start: 2024-05-20 | End: 2024-05-21

## 2024-05-20 RX ORDER — CITRIC ACID/SODIUM CITRATE 300-500 MG
15 SOLUTION, ORAL ORAL EVERY 6 HOURS
Refills: 0 | Status: DISCONTINUED | OUTPATIENT
Start: 2024-05-20 | End: 2024-05-21

## 2024-05-20 RX ORDER — OXYTOCIN 10 UNIT/ML
333.33 VIAL (ML) INJECTION
Qty: 20 | Refills: 0 | Status: DISCONTINUED | OUTPATIENT
Start: 2024-05-20 | End: 2024-05-21

## 2024-05-20 RX ADMIN — CHLORHEXIDINE GLUCONATE 1 APPLICATION(S): 213 SOLUTION TOPICAL at 05:35

## 2024-05-20 RX ADMIN — Medication 2 MILLIUNIT(S)/MIN: at 16:06

## 2024-05-20 RX ADMIN — SODIUM CHLORIDE 125 MILLILITER(S): 9 INJECTION, SOLUTION INTRAVENOUS at 09:44

## 2024-05-20 NOTE — OB PROVIDER LABOR PROGRESS NOTE - NS_SUBJECTIVE/OBJECTIVE_OBGYN_ALL_OB_FT
PGY1 Labor & Delivery Progress Note     Pt seen & examined for updated VE. Comfortable after epidural.     SVE: 4/80/03, bulging membranes felt on exam  EFM: 130/mod. variability/+accels/-decels  Ney: q1-5 min    T(C): 36.7 (05-20-24 @ 11:00), Max: 36.8 (05-20-24 @ 02:43)  HR: 77 (05-20-24 @ 13:03) (60 - 100)  BP: 118/70 (05-20-24 @ 13:01) (104/59 - 147/71)  RR: 16 (05-20-24 @ 11:00) (14 - 16)  SpO2: 99% (05-20-24 @ 13:03) (91% - 99%)
27yo , IUP 40.2 weeks  eiol, A1, GBS neg, spBC  SROM 12p, IUPC/AI  pit    Called to room for exam after PTA
Patient seen for category II tracing for variable decels with >50% of contractions. Discussed indication and process of IUPC/AI and patient reports understanding, agrees to proceed.    VE: 5.5/80/-2, IUPC placed uncomplicated

## 2024-05-20 NOTE — OB PROVIDER LABOR PROGRESS NOTE - NS_OBIHIFHRDETAILS_OBGYN_ALL_OB_FT
130/mod/+accel/+decels (variable)
130's; +variability; +occ variables/early decels; Overall FHT Category II
FHT CAT II   bpm  moderate variability  variables noted with contractions

## 2024-05-20 NOTE — OB PROVIDER H&P - NSHPPHYSICALEXAM_GEN_ALL_CORE
EFH: 130/mod/+accels, no decels  Concrete: uterine irritability  VE: 0.5/50/-3  TAUS: vertex    General: comfortable, NAD  Pulm: unlabored breathing  Abd: gravid, soft, nontender

## 2024-05-20 NOTE — OB PROVIDER H&P - HISTORY OF PRESENT ILLNESS
R1 H&P    Pt is a 27y/o  at 40+2 admitted for IOL for GDMA1. +FM, -LOF, -VB, -ctx  Patient presented for scheduled IOL. Prenatal course complicated by GDMA1, well controlled with diet. She is also s/p an amniocentesis this pregnancy for a small stomach bubble, which was found to be normal. Today, she denies HA, vision changes, CP, SOB, N/V, RUQ pain, dysuria, fevers, chills.  GBS negative  EFW 3500    OBHx:   GynHx: denies h/o abnormal paps, STI's, fibroids, cysts  PMHx: denies  PSHx: denies  Med: PNV, Fe  All: NKDA  SH: denies alcohol, tobacco, or drug use  Psych: denies h/o anxiety or depression  Accepts blood

## 2024-05-20 NOTE — OB PROVIDER H&P - ASSESSMENT
A&P:   #Induction of labor:  - Admit to L&D  - Buccal cytotec / cervical balloon when able  - routine labs  - EFM/toco  - NPO, IV hydration with rotating fluid, finger sticks per routine  - Consents signed with the patient. Discussed induction/augmentation of labor, vaginal delivery, possible operative delivery with vacuum or foreceps, possible episiotomy, possible  section. Patient expressed understanding of all the risks and benefits, and signed consent form.  - Accepts blood  Fetal: cat 1 tracing, fetal status reassuring  GBS: neg  Analgesia: epiPRN      Discussed with Dr. Feilz Guardado PGY-1

## 2024-05-20 NOTE — OB RN PATIENT PROFILE - BIRTH SEX
Cr 1 36  Unclear baseline  Suspect in the setting of poor intravascular volume 2/2 volume overload vs chronic pathology  Patient received contrast for CTA and NSAID for pain in the ED    · IV Lasix as above  Trend creatinine  Avoid nephrotoxins  Trend I/Os, daily weights  Avoid hypotension Female

## 2024-05-20 NOTE — CHART NOTE - NSCHARTNOTEFT_GEN_A_CORE
OBGYN     PTA called for persistent Cat II FHT.  Myself (), LAUREL Duncan, primary RN, ROSALIA Godoy present.    IOL for GDMA1.  Last exam was 5-6cm dilated.  FHT with moderate varibility, accels, variable decelerations.  amnioinfusion in progress.    Plan for re-examination and repositioning.  FHT overall reassuring, will continue to titrate pitocin.    MD Sarah

## 2024-05-20 NOTE — OB PROVIDER H&P - NSLOWPPHRISK_OBGYN_A_OB
No previous uterine incision/Johnson Pregnancy/Less than or equal to 4 previous vaginal births/No known bleeding disorder/No history of postpartum hemorrhage

## 2024-05-20 NOTE — CHART NOTE - NSCHARTNOTEFT_GEN_A_CORE
PTA done  Chief resident, Dr. Posada, charge and pt's nurse present    Pt FD x 2 hours and Pushing x 45 min    FHT 130s'; +variability; +variables with CTX     VTX was -2 when starting to push and now it is 0 station.    Will:    1) Allow pt to continue to labor    Will follow    MENDOZA Dai

## 2024-05-20 NOTE — OB PROVIDER LABOR PROGRESS NOTE - ASSESSMENT
P0 eIOL, patient with intermittent cat 2 tracing undergoing resuscitation    - IUPC placed, AI to start  - maternal repositioning, peanut ball as tolerated  - will start pit once cat I tracing    D/w Dr. Calix who is en house  HSinks PGY2
A/P IUP at 40+ weeks in labor.  Pt is FD.  Pt in stable condition.    Will:    1) Cont with Pitocin  2) Cont with Epidural  3) Allow pt to labor down    Will follow    MENDOZA Dai
29yo , IUP 40.2 weeks    Vital Signs Last 24 Hrs  T(C): 36.9 (20 May 2024 17:00), Max: 36.9 (20 May 2024 15:00)  T(F): 98.42 (20 May 2024 17:00), Max: 98.42 (20 May 2024 15:00)  HR: 75 (20 May 2024 17:48) (60 - 100)  BP: 121/66 (20 May 2024 17:47) (102/58 - 148/81)  RR: 18 (20 May 2024 15:00) (14 - 18)  SpO2: 98% (20 May 2024 17:48) (91% - 100%)    Parameters below as of 20 May 2024 03:14  Patient On (Oxygen Delivery Method): room air    Active labor    Plan:  Continue EFM and toco  Continue IVF  Epidural for pain management  Continue Pitocin and increase as per protocol  Anticipate     Dr. Posada updated    FWBrown, CNM  
- Pt making good cervical change   - D/c cytotec, for expectant management   - AROM forebag, clear fluid, pt tolerated well   - Cont EFM, toco     D/w Dr. Calix-Danny Betancourt PGY-1

## 2024-05-21 LAB — T PALLIDUM AB TITR SER: NEGATIVE — SIGNIFICANT CHANGE UP

## 2024-05-21 PROCEDURE — 59515 CESAREAN DELIVERY: CPT | Mod: U9

## 2024-05-21 RX ORDER — IBUPROFEN 200 MG
600 TABLET ORAL EVERY 6 HOURS
Refills: 0 | Status: COMPLETED | OUTPATIENT
Start: 2024-05-21 | End: 2025-04-19

## 2024-05-21 RX ORDER — SIMETHICONE 80 MG/1
80 TABLET, CHEWABLE ORAL EVERY 4 HOURS
Refills: 0 | Status: DISCONTINUED | OUTPATIENT
Start: 2024-05-21 | End: 2024-05-24

## 2024-05-21 RX ORDER — DEXAMETHASONE 0.5 MG/5ML
4 ELIXIR ORAL EVERY 6 HOURS
Refills: 0 | Status: DISCONTINUED | OUTPATIENT
Start: 2024-05-21 | End: 2024-05-22

## 2024-05-21 RX ORDER — OXYCODONE HYDROCHLORIDE 5 MG/1
5 TABLET ORAL
Refills: 0 | Status: DISCONTINUED | OUTPATIENT
Start: 2024-05-21 | End: 2024-05-21

## 2024-05-21 RX ORDER — SODIUM CHLORIDE 9 MG/ML
1000 INJECTION, SOLUTION INTRAVENOUS
Refills: 0 | Status: DISCONTINUED | OUTPATIENT
Start: 2024-05-21 | End: 2024-05-21

## 2024-05-21 RX ORDER — BUTORPHANOL TARTRATE 2 MG/ML
0.25 INJECTION, SOLUTION INTRAMUSCULAR; INTRAVENOUS EVERY 6 HOURS
Refills: 0 | Status: DISCONTINUED | OUTPATIENT
Start: 2024-05-21 | End: 2024-05-21

## 2024-05-21 RX ORDER — LANOLIN
1 OINTMENT (GRAM) TOPICAL EVERY 6 HOURS
Refills: 0 | Status: DISCONTINUED | OUTPATIENT
Start: 2024-05-21 | End: 2024-05-24

## 2024-05-21 RX ORDER — HYDROMORPHONE HYDROCHLORIDE 2 MG/ML
0.5 INJECTION INTRAMUSCULAR; INTRAVENOUS; SUBCUTANEOUS
Refills: 0 | Status: DISCONTINUED | OUTPATIENT
Start: 2024-05-21 | End: 2024-05-22

## 2024-05-21 RX ORDER — OXYCODONE HYDROCHLORIDE 5 MG/1
5 TABLET ORAL ONCE
Refills: 0 | Status: DISCONTINUED | OUTPATIENT
Start: 2024-05-21 | End: 2024-05-24

## 2024-05-21 RX ORDER — ACETAMINOPHEN 500 MG
975 TABLET ORAL
Refills: 0 | Status: DISCONTINUED | OUTPATIENT
Start: 2024-05-21 | End: 2024-05-24

## 2024-05-21 RX ORDER — FAMOTIDINE 10 MG/ML
20 INJECTION INTRAVENOUS ONCE
Refills: 0 | Status: COMPLETED | OUTPATIENT
Start: 2024-05-21 | End: 2024-05-21

## 2024-05-21 RX ORDER — OXYTOCIN 10 UNIT/ML
333.33 VIAL (ML) INJECTION
Qty: 20 | Refills: 0 | Status: DISCONTINUED | OUTPATIENT
Start: 2024-05-21 | End: 2024-05-21

## 2024-05-21 RX ORDER — SENNA PLUS 8.6 MG/1
2 TABLET ORAL AT BEDTIME
Refills: 0 | Status: DISCONTINUED | OUTPATIENT
Start: 2024-05-21 | End: 2024-05-24

## 2024-05-21 RX ORDER — TETANUS TOXOID, REDUCED DIPHTHERIA TOXOID AND ACELLULAR PERTUSSIS VACCINE, ADSORBED 5; 2.5; 8; 8; 2.5 [IU]/.5ML; [IU]/.5ML; UG/.5ML; UG/.5ML; UG/.5ML
0.5 SUSPENSION INTRAMUSCULAR ONCE
Refills: 0 | Status: DISCONTINUED | OUTPATIENT
Start: 2024-05-21 | End: 2024-05-24

## 2024-05-21 RX ORDER — OXYCODONE HYDROCHLORIDE 5 MG/1
10 TABLET ORAL
Refills: 0 | Status: DISCONTINUED | OUTPATIENT
Start: 2024-05-21 | End: 2024-05-21

## 2024-05-21 RX ORDER — HEPARIN SODIUM 5000 [USP'U]/ML
5000 INJECTION INTRAVENOUS; SUBCUTANEOUS EVERY 12 HOURS
Refills: 0 | Status: DISCONTINUED | OUTPATIENT
Start: 2024-05-21 | End: 2024-05-24

## 2024-05-21 RX ORDER — DIPHENHYDRAMINE HCL 50 MG
25 CAPSULE ORAL EVERY 6 HOURS
Refills: 0 | Status: DISCONTINUED | OUTPATIENT
Start: 2024-05-21 | End: 2024-05-24

## 2024-05-21 RX ORDER — NALOXONE HYDROCHLORIDE 4 MG/.1ML
0.1 SPRAY NASAL
Refills: 0 | Status: DISCONTINUED | OUTPATIENT
Start: 2024-05-21 | End: 2024-05-22

## 2024-05-21 RX ORDER — KETOROLAC TROMETHAMINE 30 MG/ML
30 SYRINGE (ML) INJECTION EVERY 6 HOURS
Refills: 0 | Status: DISCONTINUED | OUTPATIENT
Start: 2024-05-21 | End: 2024-05-22

## 2024-05-21 RX ORDER — ONDANSETRON 8 MG/1
4 TABLET, FILM COATED ORAL ONCE
Refills: 0 | Status: DISCONTINUED | OUTPATIENT
Start: 2024-05-21 | End: 2024-05-22

## 2024-05-21 RX ORDER — CITRIC ACID/SODIUM CITRATE 300-500 MG
30 SOLUTION, ORAL ORAL ONCE
Refills: 0 | Status: COMPLETED | OUTPATIENT
Start: 2024-05-21 | End: 2024-05-21

## 2024-05-21 RX ORDER — OXYCODONE HYDROCHLORIDE 5 MG/1
5 TABLET ORAL
Refills: 0 | Status: DISCONTINUED | OUTPATIENT
Start: 2024-05-21 | End: 2024-05-24

## 2024-05-21 RX ORDER — MAGNESIUM HYDROXIDE 400 MG/1
30 TABLET, CHEWABLE ORAL
Refills: 0 | Status: DISCONTINUED | OUTPATIENT
Start: 2024-05-21 | End: 2024-05-24

## 2024-05-21 RX ORDER — ONDANSETRON 8 MG/1
4 TABLET, FILM COATED ORAL EVERY 6 HOURS
Refills: 0 | Status: DISCONTINUED | OUTPATIENT
Start: 2024-05-21 | End: 2024-05-22

## 2024-05-21 RX ORDER — FERROUS SULFATE 325(65) MG
325 TABLET ORAL DAILY
Refills: 0 | Status: DISCONTINUED | OUTPATIENT
Start: 2024-05-21 | End: 2024-05-23

## 2024-05-21 RX ADMIN — Medication 30 MILLIGRAM(S): at 17:55

## 2024-05-21 RX ADMIN — HEPARIN SODIUM 5000 UNIT(S): 5000 INJECTION INTRAVENOUS; SUBCUTANEOUS at 12:15

## 2024-05-21 RX ADMIN — Medication 30 MILLIGRAM(S): at 12:15

## 2024-05-21 RX ADMIN — Medication 1000 MILLIUNIT(S)/MIN: at 04:10

## 2024-05-21 RX ADMIN — FAMOTIDINE 20 MILLIGRAM(S): 10 INJECTION INTRAVENOUS at 00:20

## 2024-05-21 RX ADMIN — Medication 975 MILLIGRAM(S): at 21:47

## 2024-05-21 RX ADMIN — Medication 30 MILLIGRAM(S): at 12:45

## 2024-05-21 RX ADMIN — Medication 975 MILLIGRAM(S): at 22:17

## 2024-05-21 RX ADMIN — SODIUM CHLORIDE 75 MILLILITER(S): 9 INJECTION, SOLUTION INTRAVENOUS at 04:10

## 2024-05-21 RX ADMIN — Medication 30 MILLILITER(S): at 00:15

## 2024-05-21 RX ADMIN — Medication 30 MILLIGRAM(S): at 17:25

## 2024-05-21 NOTE — OB RN DELIVERY SUMMARY - NS_ADMITROM_OBGYN_ALL_OB
----- Message from Zehra Velasco sent at 3/29/2018  8:13 AM CDT -----  Contact: Patient herself  X  1st Request  _  2nd Request  _  3rd Request                                       MEDICATION   REFILL  REQUEST    Please refill the medication(s) listed below. Please call the patient when the prescription(s) is ready for  at the phone number [phone] .    Medication #1  TIZANIZINE  4 mg    Preferred Pharmacy:  [facility] [address]# [phone]  /  Fax#  [phone]  
No

## 2024-05-21 NOTE — CHART NOTE - NSCHARTNOTEFT_GEN_A_CORE
Pt FD x 4 hours.  Despite initial progress, VTX has stopped descent.     VTX at 0 to +1 station.      FHT - 130's; +variability; +variable decels; Overall FHT Category II    Pt for C/S due to Arrest of Descent    Pt understands risks/benefits of surgery including but not limited to bleeding, infection, injury to bowel/bladder, nerve damage, blood tx and even death.  Pt has verbalized understanding of the above and has signed informed consent.    MENDOZA Dai Pt FD x 4 hours.  Despite initial progress, VTX has stopped descent.     VTX at 0 to +1 station.      FHT - 130's; +variability; +variable decels; Overall FHT Category II    Pt for C/S due to Arrest of Descent    Pt understands risks/benefits of surgery including but not limited to bleeding, infection, injury to bowel/bladder, nerve damage, blood tx and even death.  Pt has verbalized understanding of the above and has signed informed consent.    MENDOZA Posada examined pt and agreed that pt was not candidate for vacuum delivery.

## 2024-05-21 NOTE — OB RN INTRAOPERATIVE NOTE - NSSELHIDDEN_OBGYN_ALL_OB_FT
[NS_DeliveryAttending1_OBGYN_ALL_OB_FT:[id number]],[NS_DeliveryAssist1_OBGYN_ALL_OB_FT:[id number]],[NS_DeliveryRN_OBGYN_ALL_OB_FT:[id number]=]

## 2024-05-21 NOTE — OB PROVIDER DELIVERY SUMMARY - NSSELHIDDEN_OBGYN_ALL_OB_FT
[NS_DeliveryAttending1_OBGYN_ALL_OB_FT:[id number]],[NS_DeliveryAssist1_OBGYN_ALL_OB_FT:[id number]],[NS_DeliveryRN_OBGYN_ALL_OB_FT:[id number]=]

## 2024-05-21 NOTE — CHART NOTE - NSCHARTNOTESELECT_GEN_ALL_CORE
INR not at goal. Medications, chart, and patient findings reviewed. See calendar for adjustments to dose and follow up plan.        
labor
C/S Pre-Op Note/Event Note
PTA/Event Note

## 2024-05-21 NOTE — OB PROVIDER DELIVERY SUMMARY - NSPROVIDERDELIVERYNOTE_OBGYN_ALL_OB_FT
pLTC 2/2 arrest of descent and category 2 fetal heart tracing    Viable male infant, 3350g, 9/9 APGARS, cephalic OP presentation.    Normal uterus, tubes, and ovaries. Hysterotomy was closed in 1 layer with vicryl with 3 additional figures for hemostasis. Muscle reapproximated with chromic. Fascia closed with vicryl. Subcutaneous reapproximated with plain gut. Subcuticular skin closure with monocryl    EBL: 406  IVF: 2500  UOP: 350    DICTATION #: pLTC 2/2 arrest of descent and category 2 fetal heart tracing    Viable male infant, 3350g, 9/9 APGARS, cephalic OP presentation.    Normal uterus, tubes, and ovaries. Hysterotomy was closed in 1 layer with vicryl with 3 additional figures for hemostasis. Muscle reapproximated with chromic. Fascia closed with vicryl. Subcutaneous reapproximated with plain gut. Subcuticular skin closure with monocryl    EBL: 406  IVF: 2500  UOP: 350    DICTATION #: [id number]

## 2024-05-21 NOTE — OB PROVIDER DELIVERY SUMMARY - NSPROCPERFORMEDA_OBGYN_ALL_OB
Patient reported to staff \"I'm having a seizure\". RN entered room, patient crying out and flailing arms in bed. Patient able to communicate with RN during episode and able to follow commands. Episode lasted 30 seconds. Patient states this is what happens to her at home. Dr. Sharp notified. Will continue to monitor.    Low Segment Transverse C/S

## 2024-05-21 NOTE — OB RN DELIVERY SUMMARY - NSSELHIDDEN_OBGYN_ALL_OB_FT
[NS_DeliveryAttending1_OBGYN_ALL_OB_FT:[id number]],[NS_DeliveryAssist1_OBGYN_ALL_OB_FT:[id number]=],[NS_DeliveryRN_OBGYN_ALL_OB_FT:[id number]=]

## 2024-05-21 NOTE — OB RN DELIVERY SUMMARY - NS_SEPSISRSKCALC_OBGYN_ALL_OB_FT
No temperature has been documented for this patient in CPN or on the OB Flowsheet. Ensure the highest temperature during labor was documented on the OB Flowsheet.  No gestational age at birth has been documented. Ensure delivery date/time has been entered above.  Rupture of membranes must be entered above.   EOS calculated successfully. EOS Risk Factor: 0.5/1000 live births ([facility] national incidence); GA=40w3d; Temp=99.5; ROM=14.4; GBS='Negative'; Antibiotics='Broad spectrum antibiotics 2-3.9 hrs prior to birth'

## 2024-05-22 LAB
BASOPHILS # BLD AUTO: 0.04 K/UL — SIGNIFICANT CHANGE UP (ref 0–0.2)
BASOPHILS NFR BLD AUTO: 0.2 % — SIGNIFICANT CHANGE UP (ref 0–2)
EOSINOPHIL # BLD AUTO: 0.14 K/UL — SIGNIFICANT CHANGE UP (ref 0–0.5)
EOSINOPHIL NFR BLD AUTO: 0.8 % — SIGNIFICANT CHANGE UP (ref 0–6)
HCT VFR BLD CALC: 25.6 % — LOW (ref 34.5–45)
HGB BLD-MCNC: 8.6 G/DL — LOW (ref 11.5–15.5)
IANC: 13 K/UL — HIGH (ref 1.8–7.4)
IMM GRANULOCYTES NFR BLD AUTO: 0.6 % — SIGNIFICANT CHANGE UP (ref 0–0.9)
LYMPHOCYTES # BLD AUTO: 20.8 % — SIGNIFICANT CHANGE UP (ref 13–44)
LYMPHOCYTES # BLD AUTO: 3.85 K/UL — HIGH (ref 1–3.3)
MCHC RBC-ENTMCNC: 29.1 PG — SIGNIFICANT CHANGE UP (ref 27–34)
MCHC RBC-ENTMCNC: 33.6 GM/DL — SIGNIFICANT CHANGE UP (ref 32–36)
MCV RBC AUTO: 86.5 FL — SIGNIFICANT CHANGE UP (ref 80–100)
MONOCYTES # BLD AUTO: 1.39 K/UL — HIGH (ref 0–0.9)
MONOCYTES NFR BLD AUTO: 7.5 % — SIGNIFICANT CHANGE UP (ref 2–14)
NEUTROPHILS # BLD AUTO: 13 K/UL — HIGH (ref 1.8–7.4)
NEUTROPHILS NFR BLD AUTO: 70.1 % — SIGNIFICANT CHANGE UP (ref 43–77)
NRBC # BLD: 0 /100 WBCS — SIGNIFICANT CHANGE UP (ref 0–0)
NRBC # FLD: 0 K/UL — SIGNIFICANT CHANGE UP (ref 0–0)
PLATELET # BLD AUTO: 232 K/UL — SIGNIFICANT CHANGE UP (ref 150–400)
RBC # BLD: 2.96 M/UL — LOW (ref 3.8–5.2)
RBC # FLD: 13.2 % — SIGNIFICANT CHANGE UP (ref 10.3–14.5)
WBC # BLD: 18.54 K/UL — HIGH (ref 3.8–10.5)
WBC # FLD AUTO: 18.54 K/UL — HIGH (ref 3.8–10.5)

## 2024-05-22 RX ORDER — IBUPROFEN 200 MG
600 TABLET ORAL EVERY 6 HOURS
Refills: 0 | Status: DISCONTINUED | OUTPATIENT
Start: 2024-05-22 | End: 2024-05-24

## 2024-05-22 RX ADMIN — Medication 975 MILLIGRAM(S): at 21:40

## 2024-05-22 RX ADMIN — Medication 975 MILLIGRAM(S): at 14:55

## 2024-05-22 RX ADMIN — Medication 975 MILLIGRAM(S): at 15:25

## 2024-05-22 RX ADMIN — Medication 30 MILLIGRAM(S): at 00:06

## 2024-05-22 RX ADMIN — Medication 600 MILLIGRAM(S): at 13:05

## 2024-05-22 RX ADMIN — Medication 600 MILLIGRAM(S): at 12:35

## 2024-05-22 RX ADMIN — Medication 30 MILLIGRAM(S): at 00:30

## 2024-05-22 RX ADMIN — Medication 30 MILLIGRAM(S): at 06:18

## 2024-05-22 RX ADMIN — HEPARIN SODIUM 5000 UNIT(S): 5000 INJECTION INTRAVENOUS; SUBCUTANEOUS at 12:37

## 2024-05-22 RX ADMIN — HEPARIN SODIUM 5000 UNIT(S): 5000 INJECTION INTRAVENOUS; SUBCUTANEOUS at 00:06

## 2024-05-22 RX ADMIN — Medication 600 MILLIGRAM(S): at 17:55

## 2024-05-22 RX ADMIN — Medication 975 MILLIGRAM(S): at 21:11

## 2024-05-22 RX ADMIN — Medication 975 MILLIGRAM(S): at 09:20

## 2024-05-22 RX ADMIN — Medication 600 MILLIGRAM(S): at 18:25

## 2024-05-22 RX ADMIN — Medication 975 MILLIGRAM(S): at 08:50

## 2024-05-22 RX ADMIN — Medication 1 TABLET(S): at 12:36

## 2024-05-22 RX ADMIN — Medication 325 MILLIGRAM(S): at 12:37

## 2024-05-22 RX ADMIN — Medication 30 MILLIGRAM(S): at 05:38

## 2024-05-22 NOTE — PROGRESS NOTE ADULT - SUBJECTIVE AND OBJECTIVE BOX
Postpartum Note,  Section  Post op Day 1    Subjective:  The patient feels well.  She is ambulating.   She is tolerating regular diet.  She denies nausea and vomiting.  She is voiding.  Her pain is controlled.  She reports normal postpartum bleeding.    Physical exam:    Vital Signs Last 24 Hrs  T(C): 37 (22 May 2024 06:00), Max: 37.2 (21 May 2024 22:23)  T(F): 98.6 (22 May 2024 06:00), Max: 98.9 (21 May 2024 22:23)  HR: 88 (22 May 2024 06:00) (80 - 95)  BP: 109/68 (22 May 2024 06:00) (100/58 - 116/69)  BP(mean): --  RR: 15 (22 May 2024 06:00) (15 - 19)  SpO2: 100% (22 May 2024 06:00) (98% - 100%)        Gen: NAD    Abdomen: Soft, nontender, no distension , firm uterine fundus at umbilicus.  Incision: Clean, dry, and intact   Pelvic: Normal lochia noted  Ext: No calf tenderness    LABS:                        8.6    18.54 )-----------( 232      ( 22 May 2024 08:05 )             25.6       Rubella status:     Allergies    No Known Allergies    Intolerances      MEDICATIONS  (STANDING):  acetaminophen     Tablet .. 975 milliGRAM(s) Oral <User Schedule>  diphtheria/tetanus/pertussis (acellular) Vaccine (Adacel) 0.5 milliLiter(s) IntraMuscular once  ferrous    sulfate 325 milliGRAM(s) Oral daily  heparin   Injectable 5000 Unit(s) SubCutaneous every 12 hours  ibuprofen  Tablet. 600 milliGRAM(s) Oral every 6 hours  prenatal multivitamin 1 Tablet(s) Oral daily  senna 2 Tablet(s) Oral at bedtime    MEDICATIONS  (PRN):  dexAMETHasone  Injectable 4 milliGRAM(s) IV Push every 6 hours PRN Nausea  diphenhydrAMINE 25 milliGRAM(s) Oral every 6 hours PRN Pruritus  HYDROmorphone  Injectable 0.5 milliGRAM(s) IV Push every 10 minutes PRN Moderate Pain (4 - 6)  lanolin Ointment 1 Application(s) Topical every 6 hours PRN Sore Nipples  magnesium hydroxide Suspension 30 milliLiter(s) Oral two times a day PRN Constipation  naloxone Injectable 0.1 milliGRAM(s) IV Push every 3 minutes PRN For ANY of the following changes in patient status:  A. Breaths Per Minute LESS THAN 10, B. Oxygen saturation LESS THAN 90%, C. Sedation score of 6 for Stop After: 4 Times  ondansetron Injectable 4 milliGRAM(s) IV Push every 6 hours PRN Nausea  ondansetron Injectable 4 milliGRAM(s) IV Push once PRN Nausea and/or Vomiting  oxyCODONE    IR 5 milliGRAM(s) Oral every 3 hours PRN Moderate to Severe Pain (4-10)  oxyCODONE    IR 5 milliGRAM(s) Oral once PRN Moderate to Severe Pain (4-10)  simethicone 80 milliGRAM(s) Chew every 4 hours PRN Gas        Assessment and Plan  POD # 1 s/p  section  Doing well.  Encourage ambulation.  DC chatman  Saline lock IV

## 2024-05-22 NOTE — LACTATION INITIAL EVALUATION - LACTATION INTERVENTIONS
assisted to put baby to both breasts in football hold position with deep latch and baby noted to suck vigorously;  encouraged exclusive breastfeeding/initiate/review safe skin-to-skin/initiate/review hand expression/initiate/review techniques for position and latch/initiate/review breast massage/compression/reviewed components of an effective feeding and at least 8 effective feedings per day required/reviewed importance of monitoring infant diapers, the breastfeeding log, and minimum output each day/reviewed risks of unnecessary formula supplementation/reviewed risks of artificial nipples/reviewed benefits and recommendations for rooming in/reviewed feeding on demand/by cue at least 8 times a day/reviewed indications of inadequate milk transfer that would require supplementation

## 2024-05-22 NOTE — LACTATION INITIAL EVALUATION - INTERVENTION OUTCOME
encouraged to ask for assistance as needed/demonstrates understanding of teaching/good return demonstration/Lactation team to follow up

## 2024-05-23 LAB
BASOPHILS # BLD AUTO: 0.05 K/UL — SIGNIFICANT CHANGE UP (ref 0–0.2)
BASOPHILS NFR BLD AUTO: 0.3 % — SIGNIFICANT CHANGE UP (ref 0–2)
EOSINOPHIL # BLD AUTO: 0.14 K/UL — SIGNIFICANT CHANGE UP (ref 0–0.5)
EOSINOPHIL NFR BLD AUTO: 0.9 % — SIGNIFICANT CHANGE UP (ref 0–6)
HCT VFR BLD CALC: 26.2 % — LOW (ref 34.5–45)
HGB BLD-MCNC: 8.7 G/DL — LOW (ref 11.5–15.5)
IANC: 10.56 K/UL — HIGH (ref 1.8–7.4)
IMM GRANULOCYTES NFR BLD AUTO: 1.5 % — HIGH (ref 0–0.9)
LYMPHOCYTES # BLD AUTO: 25.3 % — SIGNIFICANT CHANGE UP (ref 13–44)
LYMPHOCYTES # BLD AUTO: 4.06 K/UL — HIGH (ref 1–3.3)
MCHC RBC-ENTMCNC: 29 PG — SIGNIFICANT CHANGE UP (ref 27–34)
MCHC RBC-ENTMCNC: 33.2 GM/DL — SIGNIFICANT CHANGE UP (ref 32–36)
MCV RBC AUTO: 87.3 FL — SIGNIFICANT CHANGE UP (ref 80–100)
MONOCYTES # BLD AUTO: 0.98 K/UL — HIGH (ref 0–0.9)
MONOCYTES NFR BLD AUTO: 6.1 % — SIGNIFICANT CHANGE UP (ref 2–14)
NEUTROPHILS # BLD AUTO: 10.56 K/UL — HIGH (ref 1.8–7.4)
NEUTROPHILS NFR BLD AUTO: 65.9 % — SIGNIFICANT CHANGE UP (ref 43–77)
NRBC # BLD: 0 /100 WBCS — SIGNIFICANT CHANGE UP (ref 0–0)
NRBC # FLD: 0 K/UL — SIGNIFICANT CHANGE UP (ref 0–0)
PLATELET # BLD AUTO: 276 K/UL — SIGNIFICANT CHANGE UP (ref 150–400)
RBC # BLD: 3 M/UL — LOW (ref 3.8–5.2)
RBC # FLD: 13 % — SIGNIFICANT CHANGE UP (ref 10.3–14.5)
WBC # BLD: 16.03 K/UL — HIGH (ref 3.8–10.5)
WBC # FLD AUTO: 16.03 K/UL — HIGH (ref 3.8–10.5)

## 2024-05-23 PROCEDURE — 99223 1ST HOSP IP/OBS HIGH 75: CPT

## 2024-05-23 RX ORDER — FERROUS SULFATE 325(65) MG
325 TABLET ORAL THREE TIMES A DAY
Refills: 0 | Status: DISCONTINUED | OUTPATIENT
Start: 2024-05-23 | End: 2024-05-24

## 2024-05-23 RX ORDER — ASCORBIC ACID 60 MG
500 TABLET,CHEWABLE ORAL DAILY
Refills: 0 | Status: DISCONTINUED | OUTPATIENT
Start: 2024-05-23 | End: 2024-05-24

## 2024-05-23 RX ADMIN — Medication 975 MILLIGRAM(S): at 03:26

## 2024-05-23 RX ADMIN — Medication 325 MILLIGRAM(S): at 15:15

## 2024-05-23 RX ADMIN — Medication 600 MILLIGRAM(S): at 13:00

## 2024-05-23 RX ADMIN — Medication 975 MILLIGRAM(S): at 03:54

## 2024-05-23 RX ADMIN — Medication 600 MILLIGRAM(S): at 12:26

## 2024-05-23 RX ADMIN — Medication 600 MILLIGRAM(S): at 00:00

## 2024-05-23 RX ADMIN — Medication 600 MILLIGRAM(S): at 00:30

## 2024-05-23 RX ADMIN — Medication 600 MILLIGRAM(S): at 18:31

## 2024-05-23 RX ADMIN — HEPARIN SODIUM 5000 UNIT(S): 5000 INJECTION INTRAVENOUS; SUBCUTANEOUS at 12:34

## 2024-05-23 RX ADMIN — Medication 975 MILLIGRAM(S): at 09:08

## 2024-05-23 RX ADMIN — Medication 1 TABLET(S): at 12:26

## 2024-05-23 RX ADMIN — Medication 975 MILLIGRAM(S): at 09:50

## 2024-05-23 RX ADMIN — HEPARIN SODIUM 5000 UNIT(S): 5000 INJECTION INTRAVENOUS; SUBCUTANEOUS at 00:00

## 2024-05-23 RX ADMIN — Medication 600 MILLIGRAM(S): at 06:06

## 2024-05-23 RX ADMIN — Medication 600 MILLIGRAM(S): at 06:30

## 2024-05-23 RX ADMIN — Medication 975 MILLIGRAM(S): at 22:00

## 2024-05-23 RX ADMIN — Medication 975 MILLIGRAM(S): at 21:27

## 2024-05-23 RX ADMIN — Medication 600 MILLIGRAM(S): at 18:05

## 2024-05-23 NOTE — CONSULT NOTE ADULT - ATTENDING COMMENTS
28y (1995) woman with a PMHx significant for  on 24 presenting w/ progressive left leg weakness and numbness. On exam, patient had proximal weakness in the left leg including hip extension, hip flexion, and knee flexion, w/ numbness in left lateral thigh.     Impression: Left lateral leg numbness likely due to injury to the left lateral femoral cutaneous nerve which is common after pregancy/delivery. However, complicated by hip extension and flexion weakness raises concern for femoral nerve injury. Cannot rule out L2/L3 disc herniation (though without back pain less likely), warranting imaging of L spine    Recommendations:  []MRI L spine w/o contrast  []MRI of left hip w/o contrast  []PT  []EMG in 2 weeks as outpatient (Upon discharge f/u w/ Dr. Rivero or Dr Santos , [address] [facility] [address], Tel: [phone])

## 2024-05-23 NOTE — PROGRESS NOTE ADULT - SUBJECTIVE AND OBJECTIVE BOX
S: 29yo POD#2 s/p pLTCS 2/2 arrest of descent and CAT 2 tracing c/b GDM A1. , H/H 12.3/36.4->8.7/26.2. Pain is controlled. She is tolerating a regular diet and passing flatus. She is voiding spontaneously and ambulating without difficulty but c/o  b/l lower extremity swelling which makes it difficult to raise left leg onto bed from sitting position. No loss of sensations in extremity. No erythema/calf tenderness noted. Denies CP/SOB. Denies lightheadedness/dizziness. Denies N/V.    O:  Vitals:  Vital Signs Last 24 Hrs  T(C): 37.1 (23 May 2024 06:20), Max: 37.1 (22 May 2024 14:16)  T(F): 98.7 (23 May 2024 06:20), Max: 98.7 (22 May 2024 14:16)  HR: 91 (23 May 2024 06:20) (85 - 93)  BP: 118/70 (23 May 2024 06:20) (117/70 - 121/74)  BP(mean): --  RR: 19 (23 May 2024 06:20) (18 - 19)  SpO2: 100% (23 May 2024 06:20) (100% - 100%)        MEDICATIONS  (STANDING):  acetaminophen     Tablet .. 975 milliGRAM(s) Oral <User Schedule>  diphtheria/tetanus/pertussis (acellular) Vaccine (Adacel) 0.5 milliLiter(s) IntraMuscular once  ferrous    sulfate 325 milliGRAM(s) Oral daily  heparin   Injectable 5000 Unit(s) SubCutaneous every 12 hours  ibuprofen  Tablet. 600 milliGRAM(s) Oral every 6 hours  prenatal multivitamin 1 Tablet(s) Oral daily  senna 2 Tablet(s) Oral at bedtime      MEDICATIONS  (PRN):  diphenhydrAMINE 25 milliGRAM(s) Oral every 6 hours PRN Pruritus  lanolin Ointment 1 Application(s) Topical every 6 hours PRN Sore Nipples  magnesium hydroxide Suspension 30 milliLiter(s) Oral two times a day PRN Constipation  oxyCODONE    IR 5 milliGRAM(s) Oral every 3 hours PRN Moderate to Severe Pain (4-10)  oxyCODONE    IR 5 milliGRAM(s) Oral once PRN Moderate to Severe Pain (4-10)  simethicone 80 milliGRAM(s) Chew every 4 hours PRN Gas      Labs:  Blood type: O Positive  Rubella IgG: RPR: Negative                          8.7<L>   16.03<H> >-----------< 276    ( 05-23 @ 06:00 )             26.2<L>                        8.6<L>   18.54<H> >-----------< 232    ( 05-22 @ 08:05 )             25.6<L>      PE:  General: NAD  Abdomen: Soft, appropriately tender, incision c/d/i with steris   Lochia: WNL  Extremities: No calf tenderness/erythema/loss of sensations. b/l lower leg and pedal non-pitting edema noted    A/P: 29yo POD#2 s/p pLTCS 2/2 arrest of descent and CAT 2 tracing c/b GDM A1.  Patient is stable and doing well post-operatively.      #GDM A1  -F/U 6-8 weeks for repeat OGTT    #Acute blood loss anemia    -H/H 12.3/36.4->8.7/26.2  -Continue iron TID and Vitamin C    #Post-Partum  - WBC downtrending; 18->16  - Continue regular diet.  - Encouraged use of abdominal binder.  - Encourage leg elevation when in bed to decrease edema  - Increase ambulation.  - Continue Motrin, Tylenol, Oxycodone PRN for pain control.    - D/C plan-tomorrow if stable    SELVIN Mckeon-BC

## 2024-05-23 NOTE — CONSULT NOTE ADULT - ASSESSMENT
Patient EVA CRAWFORD is a 28y (1995) woman with a PMHx significant for  on 24 presenting w/ progressive left leg weakness and numbness. On exam, patient had proximal weakness in the left leg including hip extension, hip flexion, and knee flexion, w/ numbness in left lateral thigh.     Impression: Left lateral leg numbness likely due to injury to the left lateral femoral cutaneous nerve which is common after pregancy/delivery. However, complicated by hip extension and flexion weakness raises concern for femoral nerve injury. Cannot rule out L2/L3 disc herniation, warranting imaging of L spine.     Recommendations:  []MRI L spine w/o contrast  []MRI of left hip w/o contrast  []PT  []EMG in 2 weeks as outpatient (Upon discharge f/u w/ Dr. Rivero or Dr Santos , [address] [facility] [address], Tel: [phone])

## 2024-05-23 NOTE — CONSULT NOTE ADULT - SUBJECTIVE AND OBJECTIVE BOX
Neurology - Consult Note    -  Spectra: [phone] ([facility]), [phone] ([facility])  -    HPI: Patient EVA CRAWFORD is a 28y (1995) woman with a PMHx significant for ***      Review of Systems:    All other review of systems is negative unless indicated above.    Allergies:  No Known Allergies      PMHx/PSHx/Family Hx: As above, otherwise see below   No pertinent past medical history        Social Hx:  No current use of tobacco, alcohol, or illicit drugs  Lives with ***    Medications:  MEDICATIONS  (STANDING):  acetaminophen     Tablet .. 975 milliGRAM(s) Oral <User Schedule>  diphtheria/tetanus/pertussis (acellular) Vaccine (Adacel) 0.5 milliLiter(s) IntraMuscular once  ferrous    sulfate 325 milliGRAM(s) Oral three times a day  heparin   Injectable 5000 Unit(s) SubCutaneous every 12 hours  ibuprofen  Tablet. 600 milliGRAM(s) Oral every 6 hours  prenatal multivitamin 1 Tablet(s) Oral daily  senna 2 Tablet(s) Oral at bedtime    MEDICATIONS  (PRN):  diphenhydrAMINE 25 milliGRAM(s) Oral every 6 hours PRN Pruritus  lanolin Ointment 1 Application(s) Topical every 6 hours PRN Sore Nipples  magnesium hydroxide Suspension 30 milliLiter(s) Oral two times a day PRN Constipation  oxyCODONE    IR 5 milliGRAM(s) Oral every 3 hours PRN Moderate to Severe Pain (4-10)  oxyCODONE    IR 5 milliGRAM(s) Oral once PRN Moderate to Severe Pain (4-10)  simethicone 80 milliGRAM(s) Chew every 4 hours PRN Gas      Vitals:  T(C): 37 (05-23-24 @ 13:58), Max: 37.1 (05-22-24 @ 14:16)  HR: 86 (05-23-24 @ 13:58) (85 - 93)  BP: 121/83 (05-23-24 @ 13:58) (117/70 - 121/83)  RR: 18 (05-23-24 @ 13:58) (18 - 19)  SpO2: 100% (05-23-24 @ 13:58) (100% - 100%)    Physical Examination: INCOMPLETE  General - NAD  Cardiovascular - Peripheral pulses palpable, no edema  Eyes - Fundoscopy with flat, sharp optic discs and no hemorrhage or exudates; Fundoscopy not well visualized; Fundoscopy not performed due to safety precautions in the setting of the COVID-19 pandemic    Neurologic Exam:  Mental status - Awake, Alert, Oriented to person, place, and time. Speech fluent, repetition and naming intact. Follows simple and complex commands. Attention/concentration, recent and remote memory (including registration and recall), and fund of knowledge intact    Cranial nerves - PERRLA, VFF, EOMI, face sensation (V1-V3) intact b/l, facial strength intact without asymmetry b/l, hearing intact b/l, palate with symmetric elevation, trapezius OR sternocleidomastiod 5/5 strength b/l, tongue midline on protrusion with full lateral movement    Motor - Normal bulk and tone throughout. No pronator drift.  Strength testing            Deltoid      Biceps      Triceps     Wrist Extension    Wrist Flexion     Interossei         R            5                 5               5                     5                              5                        5                 5  L             5                 5               5                     5                              5                        5                 5              Hip Flexion    Hip Extension    Knee Flexion    Knee Extension    Dorsiflexion    Plantar Flexion  R              5                           5                       5                           5                            5                          5  L              5                           5                        5                           5                            5                          5    Sensation - Light touch/temperature OR pain/vibration intact throughout    DTR's -             Biceps      Triceps     Brachioradialis      Patellar    Ankle    Toes/plantar response  R             2+             2+                  2+                       2+            2+                 Down  L              2+             2+                 2+                        2+           2+                 Down    Coordination - Finger to Nose intact b/l. No tremors appreciated    Gait and station - Normal casual gait. Romberg (-)    Labs:                        8.7    16.03 )-----------( 276      ( 23 May 2024 06:00 )             26.2           CAPILLARY BLOOD GLUCOSE              CSF:                  Radiology:     Neurology - Consult Note    -  Spectra: [phone] ([facility]), [phone] ([facility])  -    HPI: Patient EVA CRAWFORD is a 28y (1995) woman with a PMHx significant for  on Tuesday morning presenting w/ progressive left leg weakness and numbness. Patient was in labor for 2 to 3 hours before  was performed. Pt says that she noticed left leg weakness that got worse over 2 days. She has trouble lifting her left leg onto the bed from a sitting position, asking for assistance from her . She denies difficulty ambulating and back pain. She also describes symmetric bilateral leg swelling that began after her . She endorses a hx of sleeping on the left side while she was pregnant due to belly pain. She says this has never happened before.      Review of Systems:    All other review of systems is negative unless indicated above.    Allergies:  No Known Allergies      PMHx/PSHx/Family Hx: As above, otherwise see below   No pertinent past medical history        Social Hx:  No current use of tobacco, alcohol, or illicit drugs  Lives with ***    Medications:  MEDICATIONS  (STANDING):  acetaminophen     Tablet .. 975 milliGRAM(s) Oral <User Schedule>  diphtheria/tetanus/pertussis (acellular) Vaccine (Adacel) 0.5 milliLiter(s) IntraMuscular once  ferrous    sulfate 325 milliGRAM(s) Oral three times a day  heparin   Injectable 5000 Unit(s) SubCutaneous every 12 hours  ibuprofen  Tablet. 600 milliGRAM(s) Oral every 6 hours  prenatal multivitamin 1 Tablet(s) Oral daily  senna 2 Tablet(s) Oral at bedtime    MEDICATIONS  (PRN):  diphenhydrAMINE 25 milliGRAM(s) Oral every 6 hours PRN Pruritus  lanolin Ointment 1 Application(s) Topical every 6 hours PRN Sore Nipples  magnesium hydroxide Suspension 30 milliLiter(s) Oral two times a day PRN Constipation  oxyCODONE    IR 5 milliGRAM(s) Oral every 3 hours PRN Moderate to Severe Pain (4-10)  oxyCODONE    IR 5 milliGRAM(s) Oral once PRN Moderate to Severe Pain (4-10)  simethicone 80 milliGRAM(s) Chew every 4 hours PRN Gas      Vitals:  T(C): 37 (24 @ 13:58), Max: 37.1 (24 @ 14:16)  HR: 86 (24 @ 13:58) (85 - 93)  BP: 121/83 (24 @ 13:58) (117/70 - 121/83)  RR: 18 (24 @ 13:58) (18 - 19)  SpO2: 100% (24 @ 13:58) (100% - 100%)    Physical Examination:   General - NAD  Cardiovascular - Peripheral pulses palpable, no edema  Eyes - Fundoscopy with flat, sharp optic discs and no hemorrhage or exudates; Fundoscopy not well visualized; Fundoscopy not performed due to safety precautions in the setting of the COVID-19 pandemic    Neurologic Exam:  Mental status - Awake, Alert, Oriented to person, place, and time. Speech fluent, repetition and naming intact. Follows simple and complex commands. Attention/concentration intact    Cranial nerves - PERRLA, EOMI, face sensation (V1-V3) intact b/l, facial strength intact without asymmetry b/l trapezius and sternocleidomastiod 5/5 strength b/l, tongue midline on protrusion with full lateral movement    Motor - Normal bulk and tone throughout.     Strength testing            Deltoid      Biceps      Triceps     Wrist Extension    Wrist Flexion     Interossei         R            5                 5               5                     5                              5                        5                 5  L             5                 5               5                     5                              5                        5                 5              Hip Flexion    Hip Extension    Knee Flexion    Knee Extension    Dorsiflexion    Plantar Flexion  R              5                           5                       5                           5                            5                          5  L              4-                          4-                      4-                          5                            5                          5    Sensation - 20% Decreased light touch and pinprick sensation in the left lateral thigh. Otherwise undiminished throughout    DTR's -             Biceps      Triceps     Brachioradialis      Patellar    Ankle    Toes/plantar response  R             2+             2+                  2+                       2+            2+                 Down  L              2+             2+                 2+                        2+           2+                 Down    Gait and station - Normal casual gait. Romberg (-)    Labs:                        8.7    16.03 )-----------( 276      ( 23 May 2024 06:00 )             26.2           CAPILLARY BLOOD GLUCOSE              CSF:                  Radiology:     Neurology - Consult Note    -  Spectra: [phone] ([facility]), [phone] ([facility])  -    HPI: Patient EVA CRAWFORD is a 28y (1995) woman with a PMHx significant for  on 24 presenting w/ progressive left leg weakness and numbness. Patient was in labor for 2 to 3 hours before  was performed. Pt says that she noticed left leg weakness that got worse over past 2 days. She has trouble lifting her left leg onto the bed from a sitting position, asking for assistance from her . She also mentioned loss of sensation in left thigh. She denies difficulty ambulating and back pain. She also describes symmetric bilateral leg swelling that began after her . She endorses a hx of sleeping on the left side while she was pregnant due to belly pain. She says this has never happened before.      Review of Systems:    All other review of systems is negative unless indicated above.    Allergies:  No Known Allergies      PMHx/PSHx/Family Hx: As above, otherwise see below   No pertinent past medical history        Social Hx:  No current use of tobacco, alcohol, or illicit drugs    Medications:  MEDICATIONS  (STANDING):  acetaminophen     Tablet .. 975 milliGRAM(s) Oral <User Schedule>  diphtheria/tetanus/pertussis (acellular) Vaccine (Adacel) 0.5 milliLiter(s) IntraMuscular once  ferrous    sulfate 325 milliGRAM(s) Oral three times a day  heparin   Injectable 5000 Unit(s) SubCutaneous every 12 hours  ibuprofen  Tablet. 600 milliGRAM(s) Oral every 6 hours  prenatal multivitamin 1 Tablet(s) Oral daily  senna 2 Tablet(s) Oral at bedtime    MEDICATIONS  (PRN):  diphenhydrAMINE 25 milliGRAM(s) Oral every 6 hours PRN Pruritus  lanolin Ointment 1 Application(s) Topical every 6 hours PRN Sore Nipples  magnesium hydroxide Suspension 30 milliLiter(s) Oral two times a day PRN Constipation  oxyCODONE    IR 5 milliGRAM(s) Oral every 3 hours PRN Moderate to Severe Pain (4-10)  oxyCODONE    IR 5 milliGRAM(s) Oral once PRN Moderate to Severe Pain (4-10)  simethicone 80 milliGRAM(s) Chew every 4 hours PRN Gas      Vitals:  T(C): 37 (05-23-24 @ 13:58), Max: 37.1 (24 @ 14:16)  HR: 86 (24 @ 13:58) (85 - 93)  BP: 121/83 (24 @ 13:58) (117/70 - 121/83)  RR: 18 (24 @ 13:58) (18 - 19)  SpO2: 100% (24 @ 13:58) (100% - 100%)    Physical Examination:   General - NAD  Cardiovascular - Peripheral pulses palpable, no edema    Neurologic Exam:  Mental status - Awake, Alert, Oriented to person, place, and time. Speech fluent, repetition and naming intact. Follows simple and complex commands. Attention/concentration intact  Cranial nerves - PERRLA, EOMI, face sensation (V1-V3) intact b/l, facial strength intact without asymmetry b/l trapezius and sternocleidomastiod 5/5 strength b/l, tongue midline on protrusion with full lateral movement  Motor - Normal bulk and tone throughout.   Strength testing            Deltoid      Biceps      Triceps     Wrist Extension    Wrist Flexion     Interossei         R            5                 5               5                     5                              5                        5                 5  L             5                 5               5                     5                              5                        5                 5              Hip Flexion    Hip Extension    Knee Flexion    Knee Extension    Dorsiflexion    Plantar Flexion  R              5                           5                       5                           5                            5                          5  L              4-                          4-                      4-                          5                            5                          5    Sensation - 20% Decreased light touch and pinprick sensation in the left lateral thigh. Otherwise undiminished throughout    DTR's - brisk b/l             Biceps      Triceps     Brachioradialis      Patellar    Ankle    Toes/plantar response  R             2+             2+                  2+                       2+            2+                 Down  L              2+             2+                 2+                        2+           2+                 Down    Gait and station - Normal casual gait, but left leg was used less than right    Labs:                        8.7    16.03 )-----------( 276      ( 23 May 2024 06:00 )             26.2

## 2024-05-24 ENCOUNTER — TRANSCRIPTION ENCOUNTER (OUTPATIENT)
Age: 29
End: 2024-05-24

## 2024-05-24 VITALS
OXYGEN SATURATION: 100 % | HEART RATE: 98 BPM | SYSTOLIC BLOOD PRESSURE: 130 MMHG | TEMPERATURE: 98 F | RESPIRATION RATE: 18 BRPM | DIASTOLIC BLOOD PRESSURE: 88 MMHG

## 2024-05-24 PROCEDURE — 73721 MRI JNT OF LWR EXTRE W/O DYE: CPT | Mod: 26,LT

## 2024-05-24 PROCEDURE — 72148 MRI LUMBAR SPINE W/O DYE: CPT | Mod: 26

## 2024-05-24 PROCEDURE — 99232 SBSQ HOSP IP/OBS MODERATE 35: CPT

## 2024-05-24 RX ORDER — IBUPROFEN 200 MG
1 TABLET ORAL
Qty: 0 | Refills: 0 | DISCHARGE
Start: 2024-05-24

## 2024-05-24 RX ORDER — FOLIC ACID 0.8 MG
0 TABLET ORAL
Refills: 0 | DISCHARGE

## 2024-05-24 RX ORDER — ACETAMINOPHEN 500 MG
3 TABLET ORAL
Qty: 0 | Refills: 0 | DISCHARGE
Start: 2024-05-24

## 2024-05-24 RX ORDER — ASCORBIC ACID 60 MG
1 TABLET,CHEWABLE ORAL
Qty: 0 | Refills: 0 | DISCHARGE
Start: 2024-05-24

## 2024-05-24 RX ORDER — FERROUS SULFATE 325(65) MG
1 TABLET ORAL
Qty: 0 | Refills: 0 | DISCHARGE
Start: 2024-05-24

## 2024-05-24 RX ADMIN — Medication 975 MILLIGRAM(S): at 10:10

## 2024-05-24 RX ADMIN — Medication 600 MILLIGRAM(S): at 18:55

## 2024-05-24 RX ADMIN — Medication 975 MILLIGRAM(S): at 15:45

## 2024-05-24 RX ADMIN — Medication 600 MILLIGRAM(S): at 11:54

## 2024-05-24 RX ADMIN — HEPARIN SODIUM 5000 UNIT(S): 5000 INJECTION INTRAVENOUS; SUBCUTANEOUS at 03:13

## 2024-05-24 RX ADMIN — Medication 325 MILLIGRAM(S): at 15:45

## 2024-05-24 RX ADMIN — Medication 975 MILLIGRAM(S): at 16:20

## 2024-05-24 RX ADMIN — Medication 975 MILLIGRAM(S): at 09:34

## 2024-05-24 RX ADMIN — Medication 600 MILLIGRAM(S): at 18:16

## 2024-05-24 RX ADMIN — Medication 975 MILLIGRAM(S): at 03:42

## 2024-05-24 RX ADMIN — Medication 975 MILLIGRAM(S): at 03:12

## 2024-05-24 RX ADMIN — Medication 600 MILLIGRAM(S): at 12:30

## 2024-05-24 RX ADMIN — Medication 500 MILLIGRAM(S): at 15:46

## 2024-05-24 RX ADMIN — HEPARIN SODIUM 5000 UNIT(S): 5000 INJECTION INTRAVENOUS; SUBCUTANEOUS at 15:47

## 2024-05-24 NOTE — PHYSICAL THERAPY INITIAL EVALUATION ADULT - RANGE OF MOTION EXAMINATION, REHAB EVAL
pt demonstrates difficulty actively manipulating left LE/bilateral upper extremity ROM was WFL (within functional limits)/bilateral lower extremity ROM was WFL (within functional limits)

## 2024-05-24 NOTE — PROGRESS NOTE ADULT - SUBJECTIVE AND OBJECTIVE BOX
S:    O:  Vitals:  Vital Signs Last 24 Hrs  T(C): 37.1 (24 May 2024 06:49), Max: 37.1 (24 May 2024 06:49)  T(F): 98.7 (24 May 2024 06:49), Max: 98.7 (24 May 2024 06:49)  HR: 81 (24 May 2024 06:49) (81 - 90)  BP: 121/75 (24 May 2024 06:49) (121/75 - 122/83)  BP(mean): --  RR: 16 (24 May 2024 06:49) (16 - 18)  SpO2: 100% (24 May 2024 06:49) (100% - 100%)    Parameters below as of 23 May 2024 13:58  Patient On (Oxygen Delivery Method): room air        MEDICATIONS  (STANDING):  acetaminophen     Tablet .. 975 milliGRAM(s) Oral <User Schedule>  ascorbic acid 500 milliGRAM(s) Oral daily  diphtheria/tetanus/pertussis (acellular) Vaccine (Adacel) 0.5 milliLiter(s) IntraMuscular once  ferrous    sulfate 325 milliGRAM(s) Oral three times a day  heparin   Injectable 5000 Unit(s) SubCutaneous every 12 hours  ibuprofen  Tablet. 600 milliGRAM(s) Oral every 6 hours  prenatal multivitamin 1 Tablet(s) Oral daily  senna 2 Tablet(s) Oral at bedtime    MEDICATIONS  (PRN):  diphenhydrAMINE 25 milliGRAM(s) Oral every 6 hours PRN Pruritus  lanolin Ointment 1 Application(s) Topical every 6 hours PRN Sore Nipples  magnesium hydroxide Suspension 30 milliLiter(s) Oral two times a day PRN Constipation  oxyCODONE    IR 5 milliGRAM(s) Oral every 3 hours PRN Moderate to Severe Pain (4-10)  oxyCODONE    IR 5 milliGRAM(s) Oral once PRN Moderate to Severe Pain (4-10)  simethicone 80 milliGRAM(s) Chew every 4 hours PRN Gas      LABS:  Blood type: O Positive  Rubella IgG: RPR: Negative                          8.7<L>   16.03<H> >-----------< 276    ( 05-23 @ 06:00 )             26.2<L>                        8.6<L>   18.54<H> >-----------< 232    ( 05-22 @ 08:05 )             25.6<L>                  Physical exam:  Gen: NAD  Abdomen: Soft, nontender, no distension , firm uterine fundus at umbilicus.  Incision: Clean, dry, and intact   Pelvic: Normal lochia noted  Ext: No calf tenderness    A/P: 29yo G  P POD#3 s/p LTCS.  Patient is stable and is doing well post-operatively.  - Continue motrin, tylenol, oxycodone PRN for pain control.  - Encouraged use of abdominal binder  - Increase ambulation  - Continue regular diet  - Discharge planning    SELVIN Mckeon         S: 27yo POD#3 s/p pLTCS 2/2 arrest of descent and CAT 2 tracing c/b GDM A1. , H/H 12.3/36.4->8.7/26.2. Pain is controlled. She is tolerating a regular diet and passing flatus. She is voiding spontaneously and ambulating independently, but continues to experience numbness in the superior aspect of the LLE and difficulty lifting and flexing the knee joint. She was seen by neurology and awaits MRI of the lumbar spine and left hip and an evaluation by Physical therapy. Patient reports no loss of gross sensations or tingling in the extremity. Mild b/l LLE edema present w/o erythema/calf tenderness. Denies CP/SOB. Denies lightheadedness/dizziness. Denies N/V.      O:  Vitals:  Vital Signs Last 24 Hrs  T(C): 37.1 (24 May 2024 06:49), Max: 37.1 (24 May 2024 06:49)  T(F): 98.7 (24 May 2024 06:49), Max: 98.7 (24 May 2024 06:49)  HR: 81 (24 May 2024 06:49) (81 - 90)  BP: 121/75 (24 May 2024 06:49) (121/75 - 122/83)  BP(mean): --  RR: 16 (24 May 2024 06:49) (16 - 18)  SpO2: 100% (24 May 2024 06:49) (100% - 100%)    Parameters above as of 23 May 2024 13:58  Patient On (Oxygen Delivery Method): room air        MEDICATIONS  (STANDING):  acetaminophen     Tablet .. 975 milliGRAM(s) Oral <User Schedule>  ascorbic acid 500 milliGRAM(s) Oral daily  diphtheria/tetanus/pertussis (acellular) Vaccine (Adacel) 0.5 milliLiter(s) IntraMuscular once  ferrous    sulfate 325 milliGRAM(s) Oral three times a day  heparin   Injectable 5000 Unit(s) SubCutaneous every 12 hours  ibuprofen  Tablet. 600 milliGRAM(s) Oral every 6 hours  prenatal multivitamin 1 Tablet(s) Oral daily  senna 2 Tablet(s) Oral at bedtime    MEDICATIONS  (PRN):  diphenhydrAMINE 25 milliGRAM(s) Oral every 6 hours PRN Pruritus  lanolin Ointment 1 Application(s) Topical every 6 hours PRN Sore Nipples  magnesium hydroxide Suspension 30 milliLiter(s) Oral two times a day PRN Constipation  oxyCODONE    IR 5 milliGRAM(s) Oral every 3 hours PRN Moderate to Severe Pain (4-10)  oxyCODONE    IR 5 milliGRAM(s) Oral once PRN Moderate to Severe Pain (4-10)  simethicone 80 milliGRAM(s) Chew every 4 hours PRN Gas      LABS:  Blood type: O Positive  Rubella IgG: RPR: Negative                          8.7<L>   16.03<H> >-----------< 276    ( 05-23 @ 06:00 )             26.2<L>                        8.6<L>   18.54<H> >-----------< 232    ( 05-22 @ 08:05 )             25.6<L>      Physical exam:  Gen: NAD  Abdomen: Soft, nontender, no distension , firm uterine fundus 2 below umbilicus.  Incision: Clean, dry, and intact with steris  Pelvic: Normal lochia noted  Ext: No calf tenderness/erythema. B/L LE mild edema. LLE with superior aspect mild numbness and difficulty flexing knee joint    A/P: 27yo POD#3 s/p arrest of descent and CAT 2 tracing c/b GDM A1 and now continues to experience mild numbness in the superior aspect of the LLE and difficulty lifting and flexing the knee joint. She was seen by neurology and awaits MRI of the lumbar spine and left hip along with PT evaluation. Patient is otherwise stable.    #LLE   -Continues to experience mild numbness in the superior aspect of the LLE and difficulty lifting and flexing the knee joint.   -Followed by neurology  -Awaits PT and MRI of the left hip and lumbar spine    #GDM A1  -F/U 6-8 weeks for repeat OGTT    #Post-Partum  - Continue Motrin, Tylenol, Oxycodone PRN for pain control.  - Encouraged use of abdominal binder  - Increase ambulation  - Continue regular diet  - Discharge planning in progress    SELVIN Mckeon-BC         S: 27yo POD#3 s/p pLTCS 2/2 arrest of descent and CAT 2 tracing c/b GDM A1. , H/H 12.3/36.4->8.7/26.2. Pain is controlled. She is tolerating a regular diet and passing flatus. She is voiding spontaneously and ambulating independently, but continues to experience numbness in the superior aspect of the LLE and difficulty lifting and flexing the knee joint. She was seen by neurology and awaits MRI of the lumbar spine and left hip and an evaluation by Physical therapy. Patient reports no loss of gross sensations or tingling in the extremity. Mild b/l LLE edema present w/o erythema/calf tenderness. Denies CP/SOB. Denies lightheadedness/dizziness. Denies N/V.      O:  Vitals:  Vital Signs Last 24 Hrs  T(C): 37.1 (24 May 2024 06:49), Max: 37.1 (24 May 2024 06:49)  T(F): 98.7 (24 May 2024 06:49), Max: 98.7 (24 May 2024 06:49)  HR: 81 (24 May 2024 06:49) (81 - 90)  BP: 121/75 (24 May 2024 06:49) (121/75 - 122/83)  BP(mean): --  RR: 16 (24 May 2024 06:49) (16 - 18)  SpO2: 100% (24 May 2024 06:49) (100% - 100%)    Parameters above as of 23 May 2024 13:58  Patient On (Oxygen Delivery Method): room air        MEDICATIONS  (STANDING):  acetaminophen     Tablet .. 975 milliGRAM(s) Oral <User Schedule>  ascorbic acid 500 milliGRAM(s) Oral daily  diphtheria/tetanus/pertussis (acellular) Vaccine (Adacel) 0.5 milliLiter(s) IntraMuscular once  ferrous    sulfate 325 milliGRAM(s) Oral three times a day  heparin   Injectable 5000 Unit(s) SubCutaneous every 12 hours  ibuprofen  Tablet. 600 milliGRAM(s) Oral every 6 hours  prenatal multivitamin 1 Tablet(s) Oral daily  senna 2 Tablet(s) Oral at bedtime    MEDICATIONS  (PRN):  diphenhydrAMINE 25 milliGRAM(s) Oral every 6 hours PRN Pruritus  lanolin Ointment 1 Application(s) Topical every 6 hours PRN Sore Nipples  magnesium hydroxide Suspension 30 milliLiter(s) Oral two times a day PRN Constipation  oxyCODONE    IR 5 milliGRAM(s) Oral every 3 hours PRN Moderate to Severe Pain (4-10)  oxyCODONE    IR 5 milliGRAM(s) Oral once PRN Moderate to Severe Pain (4-10)  simethicone 80 milliGRAM(s) Chew every 4 hours PRN Gas      LABS:  Blood type: O Positive  Rubella IgG: RPR: Negative                          8.7<L>   16.03<H> >-----------< 276    ( 05-23 @ 06:00 )             26.2<L>                        8.6<L>   18.54<H> >-----------< 232    ( 05-22 @ 08:05 )             25.6<L>      Physical exam:  Gen: NAD  Abdomen: Soft, nontender, no distension , firm uterine fundus 2 below umbilicus.  Incision: Clean, dry, and intact with steris  Pelvic: Normal lochia noted  Ext: No calf tenderness/erythema. B/L LE mild edema. LLE with superior aspect mild numbness and difficulty flexing knee joint    A/P: 27yo POD#3 s/p arrest of descent and CAT 2 tracing c/b GDM A1 and now continues to experience mild numbness in the superior aspect of the LLE and difficulty lifting and flexing the knee joint. She was seen by neurology and awaits MRI of the lumbar spine and left hip along with PT evaluation. Patient is otherwise stable.    #LLE numbness  -Continues to experience mild numbness in the superior aspect of the LLE and difficulty lifting and flexing the knee joint.   -Followed by neurology  -Awaits PT and MRI of the left hip and lumbar spine    #GDM A1  -F/U 6-8 weeks for repeat OGTT    #Acute blood loss anemia    -H/H 12.3/36.4->8.7/26.2  -Continue iron TID and Vitamin C    #Post-Partum  - Continue Motrin, Tylenol, Oxycodone PRN for pain control.  - Encouraged use of abdominal binder  - Increase ambulation  - Continue regular diet  - Discharge planning in progress    SELVIN Mckeon-BC

## 2024-05-24 NOTE — DISCHARGE NOTE OB - CARE PROVIDER_API CALL
Jero Jiang  Maternal/Fetal Medicine  [address], [address]  [place] [address]  Phone: [phone]  Fax: [phone]  Follow Up Time:

## 2024-05-24 NOTE — PHYSICAL THERAPY INITIAL EVALUATION ADULT - ADDITIONAL COMMENTS
Pt lives with her  in a house with 12 steps to enter. Pt did not use an assistive device and was independent with ADLs prior. Pt reports no falls in the past 6 months.   Pt left sitting at edge of the bed in NAD, call bell in reach and SARANYA Estrada at bedside and made aware.

## 2024-05-24 NOTE — DISCHARGE NOTE OB - PLAN OF CARE
After discharge, please stay on pelvic rest for 6 weeks, meaning no sexual intercourse, no tampons and no douching.  No driving for 2 weeks as women can loose a lot of blood during delivery and there is a possibility of being lightheaded/fainting.  No lifting objects heavier than baby for two weeks.  Expect to have vaginal bleeding/spotting for up to six weeks.  The bleeding should get lighter and more white/light brown with time.  For bleeding soaking more than a pad an hour or passing clots greater than the size of your fist, come in to the emergency department.    Follow up in OB office in 1-2 weeks for incision check.  Call for noticeable increase in redness or swelling at incision, discharge from incision, or opening of skin at incision site F/U 6-8 weeks for repeat oral glucose tolerance test Left leg Numbness most likely due to lateral femoral cutaneous nerve 2/2 pregancy/delivery.   -S/P MRI of the left hip and lumbar spine -S/P MRI of the left hip and lumbar spine  -EMG in 2 weeks as outpatient (Upon discharge f/u w/ Dr. Rivero or Dr Santos , [address] [facility] [address], Tel: [phone])   -O/P physical therapy -S/P MRI of the left hip and lumbar spine:  Lumbar spine-Mild levoscoliosis. Mild degenerative disc disease L5-S1. No disc herniations or spinal stenosis  Left Hip-Mild gluteus medius and gluteus minimus peritendinous edema. Mild greater trochanteric bursitis.   -EMG in 2 weeks as outpatient (Upon discharge f/u w/ Dr. Rivero or Dr Santos , [address] [facility] [address], Tel: [phone])   -O/P physical therapy

## 2024-05-24 NOTE — DISCHARGE NOTE OB - CARE PLAN
Principal Discharge DX:	S/P  section  Assessment and plan of treatment:	After discharge, please stay on pelvic rest for 6 weeks, meaning no sexual intercourse, no tampons and no douching.  No driving for 2 weeks as women can loose a lot of blood during delivery and there is a possibility of being lightheaded/fainting.  No lifting objects heavier than baby for two weeks.  Expect to have vaginal bleeding/spotting for up to six weeks.  The bleeding should get lighter and more white/light brown with time.  For bleeding soaking more than a pad an hour or passing clots greater than the size of your fist, come in to the emergency department.    Follow up in OB office in 1-2 weeks for incision check.  Call for noticeable increase in redness or swelling at incision, discharge from incision, or opening of skin at incision site  Secondary Diagnosis:	Gestational diabetes  Assessment and plan of treatment:	F/U 6-8 weeks for repeat oral glucose tolerance test   1 Principal Discharge DX:	S/P  section  Assessment and plan of treatment:	After discharge, please stay on pelvic rest for 6 weeks, meaning no sexual intercourse, no tampons and no douching.  No driving for 2 weeks as women can loose a lot of blood during delivery and there is a possibility of being lightheaded/fainting.  No lifting objects heavier than baby for two weeks.  Expect to have vaginal bleeding/spotting for up to six weeks.  The bleeding should get lighter and more white/light brown with time.  For bleeding soaking more than a pad an hour or passing clots greater than the size of your fist, come in to the emergency department.    Follow up in OB office in 1-2 weeks for incision check.  Call for noticeable increase in redness or swelling at incision, discharge from incision, or opening of skin at incision site  Secondary Diagnosis:	Gestational diabetes  Assessment and plan of treatment:	F/U 6-8 weeks for repeat oral glucose tolerance test  Assessment and plan of treatment:	Left leg Numbness most likely due to lateral femoral cutaneous nerve 2/2 pregancy/delivery.   -S/P MRI of the left hip and lumbar spine   Principal Discharge DX:	S/P  section  Assessment and plan of treatment:	After discharge, please stay on pelvic rest for 6 weeks, meaning no sexual intercourse, no tampons and no douching.  No driving for 2 weeks as women can loose a lot of blood during delivery and there is a possibility of being lightheaded/fainting.  No lifting objects heavier than baby for two weeks.  Expect to have vaginal bleeding/spotting for up to six weeks.  The bleeding should get lighter and more white/light brown with time.  For bleeding soaking more than a pad an hour or passing clots greater than the size of your fist, come in to the emergency department.    Follow up in OB office in 1-2 weeks for incision check.  Call for noticeable increase in redness or swelling at incision, discharge from incision, or opening of skin at incision site  Secondary Diagnosis:	Gestational diabetes  Assessment and plan of treatment:	F/U 6-8 weeks for repeat oral glucose tolerance test  Secondary Diagnosis:	Numbness in left leg  Assessment and plan of treatment:	-S/P MRI of the left hip and lumbar spine  -EMG in 2 weeks as outpatient (Upon discharge f/u w/ Dr. Rivero or Dr Santos , [address] [facility] [address], Tel: [phone])   -O/P physical therapy   Principal Discharge DX:	S/P  section  Assessment and plan of treatment:	After discharge, please stay on pelvic rest for 6 weeks, meaning no sexual intercourse, no tampons and no douching.  No driving for 2 weeks as women can loose a lot of blood during delivery and there is a possibility of being lightheaded/fainting.  No lifting objects heavier than baby for two weeks.  Expect to have vaginal bleeding/spotting for up to six weeks.  The bleeding should get lighter and more white/light brown with time.  For bleeding soaking more than a pad an hour or passing clots greater than the size of your fist, come in to the emergency department.    Follow up in OB office in 1-2 weeks for incision check.  Call for noticeable increase in redness or swelling at incision, discharge from incision, or opening of skin at incision site  Secondary Diagnosis:	Gestational diabetes  Assessment and plan of treatment:	F/U 6-8 weeks for repeat oral glucose tolerance test  Secondary Diagnosis:	Numbness in left leg  Assessment and plan of treatment:	-S/P MRI of the left hip and lumbar spine:  Lumbar spine-Mild levoscoliosis. Mild degenerative disc disease L5-S1. No disc herniations or spinal stenosis  Left Hip-Mild gluteus medius and gluteus minimus peritendinous edema. Mild greater trochanteric bursitis.   -EMG in 2 weeks as outpatient (Upon discharge f/u w/ Dr. Rivero or Dr Santos , [address] [facility] [address], Tel: [phone])   -O/P physical therapy

## 2024-05-24 NOTE — DISCHARGE NOTE OB - HOSPITAL COURSE
pLTC 2/2 arrest of descent and category 2 fetal heart tracing    Viable male infant, 3350g, 9/9 APGARS, cephalic OP presentation.    Normal uterus, tubes, and ovaries. Hysterotomy was closed in 1 layer with vicryl with 3 additional figures for hemostasis. Muscle reapproximated with chromic. Fascia closed with vicryl. Subcutaneous reapproximated with plain gut. Subcuticular skin closure with monocryl    Left leg numbness; s/p MRI(see results). For O/P physical therapy and neurology F/U.

## 2024-05-24 NOTE — DISCHARGE NOTE OB - MEDICATION SUMMARY - MEDICATIONS TO TAKE
I will START or STAY ON the medications listed below when I get home from the hospital:    ibuprofen 600 mg oral tablet  -- 1 tab(s) by mouth every 6 hours as needed for  moderate pain  -- Indication: For Pain    acetaminophen 325 mg oral tablet  -- 3 tab(s) by mouth every 6 hours as needed for  mild pain  -- Indication: For Pain    multivitamin, prenatal  -- 1 tab(s) by mouth once a day  -- Indication: For Vitamin    ferrous sulfate 325 mg (65 mg elemental iron) oral tablet  -- 1 tab(s) by mouth 3 times a day  -- Indication: For Anemia    ascorbic acid 500 mg oral tablet  -- 1 tab(s) by mouth once a day  -- Indication: For Vitamin

## 2024-05-24 NOTE — PROGRESS NOTE ADULT - SUBJECTIVE AND OBJECTIVE BOX
*************************************  NEUROLOGY PROGRESS NOTE  **************************************    EVA CRAWFORD  Female  MRN-[id number]    Subjective: Patient seen and examined at bedside with Neurology team and attending     Interval History:    ???????????      VITAL SIGNS:  Vital Signs Last 24 Hrs  T(C): 37.1 (24 May 2024 06:49), Max: 37.1 (24 May 2024 06:49)  T(F): 98.7 (24 May 2024 06:49), Max: 98.7 (24 May 2024 06:49)  HR: 81 (24 May 2024 06:49) (81 - 90)  BP: 121/75 (24 May 2024 06:49) (121/75 - 122/83)  BP(mean): --  RR: 16 (24 May 2024 06:49) (16 - 18)  SpO2: 100% (24 May 2024 06:49) (100% - 100%)    Parameters below as of 23 May 2024 13:58  Patient On (Oxygen Delivery Method): room air        Physical Examination:   General - NAD  Cardiovascular - Peripheral pulses palpable, no edema    Neurologic Exam:  Mental status - Awake, Alert, Oriented to person, place, and time. Speech fluent, repetition and naming intact. Follows simple and complex commands. Attention/concentration intact  Cranial nerves - PERRLA, EOMI, face sensation (V1-V3) intact b/l, facial strength intact without asymmetry b/l trapezius and sternocleidomastiod 5/5 strength b/l, tongue midline on protrusion with full lateral movement  Motor - Normal bulk and tone throughout.   Strength testing            Deltoid      Biceps      Triceps     Wrist Extension    Wrist Flexion     Interossei         R            5                 5               5                     5                              5                        5                 5  L             5                 5               5                     5                              5                        5                 5              Hip Flexion    Hip Extension    Knee Flexion    Knee Extension    Dorsiflexion    Plantar Flexion  R              5                           5                       5                           5                            5                          5  L              4-                          4-                      4-                          5                            5                          5    Sensation - 20% Decreased light touch and pinprick sensation in the left lateral thigh. Otherwise undiminished throughout    DTR's - brisk b/l             Biceps      Triceps     Brachioradialis      Patellar    Ankle    Toes/plantar response  R             2+             2+                  2+                       2+            2+                 Down  L              2+             2+                 2+                        2+           2+                 Down    Gait and station - Normal casual gait, but left leg was used less than right      LABS:    CBC                       8.7    16.03 )-----------( 276      ( 23 May 2024 06:00 )             26.2     Chem       LFTs   Coagulopathy   Lipid Panel   A1c   Cardiac enzymes     U/A   CSF  Immunological  Other      RADIOLOGY & ADDITIONAL STUDIES:           *************************************  NEUROLOGY PROGRESS NOTE  **************************************    EVA CRAWFORD  Female  MRN-[id number]    Subjective: Patient seen and examined at bedside with Neurology team and attending     Interval History: Patient states that symptoms have been stable. No increased weakness or numbness reported. No new pain.       VITAL SIGNS:  Vital Signs Last 24 Hrs  T(C): 37.1 (24 May 2024 06:49), Max: 37.1 (24 May 2024 06:49)  T(F): 98.7 (24 May 2024 06:49), Max: 98.7 (24 May 2024 06:49)  HR: 81 (24 May 2024 06:49) (81 - 90)  BP: 121/75 (24 May 2024 06:49) (121/75 - 122/83)  BP(mean): --  RR: 16 (24 May 2024 06:49) (16 - 18)  SpO2: 100% (24 May 2024 06:49) (100% - 100%)    Parameters below as of 23 May 2024 13:58  Patient On (Oxygen Delivery Method): room air        Physical Examination:   General - NAD  Cardiovascular - Peripheral pulses palpable, no edema    Neurologic Exam:  Mental status - Awake, Alert, Oriented to person, place, and time. Speech fluent, repetition and naming intact. Follows simple and complex commands. Attention/concentration intact  Motor - Normal bulk and tone throughout.   Strength testing            Deltoid      Biceps      Triceps     Wrist Extension    Wrist Flexion     Interossei         R            5                 5               5                     5                              5                        5                 5  L             5                 5               5                     5                              5                        5                 5              Hip Flexion    Hip Extension    Knee Flexion    Knee Extension    Dorsiflexion    Plantar Flexion  R              5                           5                       5                           5                            5                          5  L              4+                          4+                    5                           5                            5                          5     Sensation - 20% Decreased light touch and pinprick sensation in the left lateral thigh. Otherwise undiminished throughout  Gait and station - Not assessed    LABS:    CBC                       8.7    16.03 )-----------( 276      ( 23 May 2024 06:00 )             26.2     Chem       LFTs   Coagulopathy   Lipid Panel   A1c   Cardiac enzymes     U/A   CSF  Immunological  Other      RADIOLOGY & ADDITIONAL STUDIES:

## 2024-05-24 NOTE — PROGRESS NOTE ADULT - SUBJECTIVE AND OBJECTIVE BOX
S: Patient doing well. No complaints. Minimal lochia. Pain controlled.  Leg weakness improving but numbness is there. Appreciate neurology follow p  Patient is getting her MRI today. If normal she nolan be discharged for out patient management    O: Vital Signs Last 24 Hrs  T(C): 37.1 (24 May 2024 06:49), Max: 37.1 (24 May 2024 06:49)  T(F): 98.7 (24 May 2024 06:49), Max: 98.7 (24 May 2024 06:49)  HR: 81 (24 May 2024 06:49) (81 - 90)  BP: 121/75 (24 May 2024 06:49) (121/75 - 122/83)  BP(mean): --  RR: 16 (24 May 2024 06:49) (16 - 18)  SpO2: 100% (24 May 2024 06:49) (100% - 100%)    Parameters below as of 23 May 2024 13:58  Patient On (Oxygen Delivery Method): room air        Gen: NAD  Abd: soft, Nontender, Nondistended, fundus firm  Ext: no tendern, mild edema    Labs:                        8.7    16.03 )-----------( 276      ( 23 May 2024 06:00 )             26.2       A: 28y Post op patient following c/s with leg pain and weakness  Will follow up with MRI results    Plan:  Routine postpartum care  Encouraged out of bed  Regular diet

## 2024-05-24 NOTE — PROGRESS NOTE ADULT - ATTENDING COMMENTS
Pt reporting Left LE weakness and difficulty ambulating.  Will call Neuro for consult.
28y (1995) woman with a PMHx significant for  on 24 presenting w/ progressive left leg weakness and numbness. On exam, patient had proximal weakness in the left leg including hip extension, hip flexion, and knee flexion, w/ numbness in left lateral thigh.     Impression: Left lateral leg numbness likely due to injury to the left lateral femoral cutaneous nerve which is common after pregancy/delivery. However, complicated by hip extension and flexion weakness raises concern for femoral nerve injury. Cannot rule out L2/L3 disc herniation (though without back pain less likely), warranting imaging of L spine    Recommendations:  []MRI L spine w/o contrast  []MRI of left hip w/o contrast  []PT  []EMG in 2 weeks as outpatient (Upon discharge f/u w/ Dr. Rivero or Dr Santos , [address] [facility] [address], Tel: [phone])

## 2024-05-24 NOTE — PHYSICAL THERAPY INITIAL EVALUATION ADULT - PERTINENT HX OF CURRENT PROBLEM, REHAB EVAL
Pt is a 28 year old female POD#3 s/p pLTCS 2/2 arrest of descent and CAT 2 tracing c/b GDM A1. She is voiding spontaneously and ambulating independently, but continues to experience numbness in the superior aspect of the LLE and difficulty lifting and flexing the knee joint. She was seen by neurology and awaits MRI of the lumbar spine and left hip and an evaluation by Physical therapy. Patient reports no loss of gross sensations or tingling in the extremity. Mild b/l LLE edema present w/o erythema/calf tenderness. Denies CP/SOB. Denies lightheadedness/dizziness. Denies N/V.

## 2024-05-24 NOTE — PROGRESS NOTE ADULT - ASSESSMENT
Patient EVA CRAWFORD is a 28y (1995) woman with a PMHx significant for  on 24 presenting w/ progressive left leg weakness and numbness. On exam, patient had proximal weakness in the left leg including hip extension, hip flexion, and knee flexion, w/ numbness in left lateral thigh.     Impression: Left lateral leg numbness likely due to injury to the left lateral femoral cutaneous nerve which is common after pregancy/delivery. However, complicated by hip extension and flexion weakness raises concern for femoral nerve injury. Cannot rule out L2/L3 disc herniation, warranting imaging of L spine.     Recommendations:  []MRI L spine w/o contrast  []MRI of left hip w/o contrast  []PT  []EMG in 2 weeks as outpatient (Upon discharge f/u w/ Dr. Rivero or Dr Santos , [address] [facility] [address], Tel: [phone])     Patient EVA CRAWFORD is a 28y (1995) woman with a PMHx significant for  on 24 presenting w/ progressive left leg weakness and numbness. On exam 24 patient has increased strength in hip extension and hip flexion with some residual weakness. Leg flexion is at full strength.    Impression: Numbness is still most likely due to lateral femoral cutaneous nerve 2/2 pregancy/delivery. Hip extension and flexion weakness could indicate femoral nerve involvement. Both nerves originate from L2/L3 levels, with disc herniation on the differential, though lack of pain makes this less likely. Improvement in strength rules out progressive/degenerative demyelinating or axonal diseases.     Recommendations:  [] MRI L spine and L hip pending, will f/u on results  [] If MRI normal neuro will sign off, but please call if symptoms worsen  [] PT  [] EMG in 2 weeks as outpatient (Upon discharge f/u w/ Dr. Rivero or Dr Santos , [address] [facility] [address], Tel: [phone])     Discussed with Dr. Wasserman attending    Patient EVA CRAWFORD is a 28y (1995) woman with a PMHx significant for  on 24 presenting w/ progressive left leg weakness and numbness. On exam 24 patient has increased strength in hip extension and hip flexion with some residual weakness. Leg flexion is at full strength.    Impression: Numbness is still most likely due to lateral femoral cutaneous nerve 2/2 pregancy/delivery. Hip extension and flexion weakness could indicate additional femoral nerve involvement. Both nerves originate from L2/L3 levels, with disc herniation on the differential, though lack of pain makes this less likely. Improvement in strength rules out progressive/degenerative demyelinating or axonal diseases.     Recommendations:  [] MRI L spine and L hip pending, will f/u on results  [] If MRI normal neuro will sign off, but please call if symptoms worsen  [] PT  [] EMG in 2 weeks as outpatient (Upon discharge f/u w/ Dr. Rivero or Dr Santos , [address] [facility] [address], Tel: [phone])     Discussed with Dr. Wasserman attending

## 2024-05-24 NOTE — DISCHARGE NOTE OB - PATIENT PORTAL LINK FT
You can access the FollowMyHealth Patient Portal offered by [facility] by registering at the following website: [email or web address]. By joining [employer]’s FollowMyHealth portal, you will also be able to view your health information using other applications (apps) compatible with our system.

## 2024-06-03 ENCOUNTER — APPOINTMENT (OUTPATIENT)
Dept: OBGYN | Facility: CLINIC | Age: 29
End: 2024-06-03
Payer: COMMERCIAL

## 2024-06-03 VITALS — DIASTOLIC BLOOD PRESSURE: 87 MMHG | SYSTOLIC BLOOD PRESSURE: 126 MMHG | WEIGHT: 112 LBS | BODY MASS INDEX: 20.49 KG/M2

## 2024-06-03 DIAGNOSIS — R20.0 ANESTHESIA OF SKIN: ICD-10-CM

## 2024-06-03 PROCEDURE — 0503F POSTPARTUM CARE VISIT: CPT

## 2024-06-03 RX ORDER — ASCORBIC ACID, CHOLECALCIFEROL, .ALPHA.-TOCOPHEROL ACETATE, DL-, PYRIDOXINE, FOLIC ACID, CYANOCOBALAMIN, CALCIUM, FERROUS FUMARATE, MAGNESIUM, DOCONEXENT 85; 200; 10; 25; 1; 12; 140; 27; 45; 300 [IU]/1; [IU]/1; [IU]/1; [IU]/1; MG/1; UG/1; MG/1; MG/1; MG/1; MG/1
27-0.6-0.4-3 CAPSULE, GELATIN COATED ORAL
Qty: 30 | Refills: 8 | Status: ACTIVE | COMMUNITY
Start: 2024-06-03 | End: 1900-01-01

## 2024-06-03 RX ORDER — FERRIC MALTOL 30 MG/1
30 CAPSULE ORAL
Qty: 3 | Refills: 1 | Status: ACTIVE | COMMUNITY
Start: 2024-06-03 | End: 1900-01-01

## 2024-06-03 NOTE — HISTORY OF PRESENT ILLNESS
[Complications:___] : complications include: [unfilled] [Delivery Date: ___] : on [unfilled] [Primary C/S] : delivered by  section [Male] : Delivery History: baby boy [Wt. ___] : weighing [unfilled] [Breastfeeding] : currently nursing [BF with Difficulty] : nursing without difficulty [None] : No associated symptoms are reported [Clean/Dry/Intact] : clean, dry and intact [Erythema] : not erythematous [Swelling] : not swollen [Dehiscence] : not dehisced [Healed] : healed [Mild] : mild vaginal bleeding [Examination Of The Breasts] : breasts are normal [Doing Well] : is doing well [No Sign of Infection] : is showing no signs of infection [Excellent Pain Control] : has excellent pain control [[email or web address]] : Incision check, patient s/p primary c/s on 05/21/2024 for failure to descent and NFHR. Patient and mother doing well.  [de-identified] : steri strips removed. patient feeling well without complaints [de-identified] : incision healing well  steri strips removed  discussed  briefly contraception options  f/u in 6 week for PP visit

## 2024-06-06 ENCOUNTER — APPOINTMENT (OUTPATIENT)
Dept: NEUROLOGY | Facility: CLINIC | Age: 29
End: 2024-06-06

## 2024-06-27 ENCOUNTER — APPOINTMENT (OUTPATIENT)
Dept: ANTEPARTUM | Facility: CLINIC | Age: 29
End: 2024-06-27

## 2024-06-27 DIAGNOSIS — O26.899 OTHER SPECIFIED PREGNANCY RELATED CONDITIONS, UNSPECIFIED TRIMESTER: ICD-10-CM

## 2024-07-10 ENCOUNTER — APPOINTMENT (OUTPATIENT)
Dept: OBGYN | Facility: CLINIC | Age: 29
End: 2024-07-10
Payer: COMMERCIAL

## 2024-07-10 VITALS — SYSTOLIC BLOOD PRESSURE: 113 MMHG | BODY MASS INDEX: 19.94 KG/M2 | DIASTOLIC BLOOD PRESSURE: 80 MMHG | WEIGHT: 109 LBS

## 2024-07-10 PROCEDURE — 0503F POSTPARTUM CARE VISIT: CPT

## 2024-07-10 RX ORDER — NORETHINDRONE 0.35 MG/1
0.35 TABLET ORAL DAILY
Qty: 3 | Refills: 2 | Status: ACTIVE | COMMUNITY
Start: 2024-07-10 | End: 1900-01-01

## 2024-10-11 ENCOUNTER — APPOINTMENT (OUTPATIENT)
Dept: OBGYN | Facility: CLINIC | Age: 29
End: 2024-10-11

## 2025-08-11 ENCOUNTER — APPOINTMENT (OUTPATIENT)
Dept: OBGYN | Facility: CLINIC | Age: 30
End: 2025-08-11
Payer: COMMERCIAL

## 2025-08-11 VITALS
WEIGHT: 94 LBS | BODY MASS INDEX: 17.3 KG/M2 | HEIGHT: 62 IN | DIASTOLIC BLOOD PRESSURE: 70 MMHG | SYSTOLIC BLOOD PRESSURE: 110 MMHG

## 2025-08-11 DIAGNOSIS — R63.4 ABNORMAL WEIGHT LOSS: ICD-10-CM

## 2025-08-11 DIAGNOSIS — Z01.419 ENCOUNTER FOR GYNECOLOGICAL EXAMINATION (GENERAL) (ROUTINE) W/OUT ABNORMAL FINDINGS: ICD-10-CM

## 2025-08-11 PROCEDURE — G0444 DEPRESSION SCREEN ANNUAL: CPT

## 2025-08-11 RX ORDER — NORETHINDRONE 0.35 MG/1
0.35 TABLET ORAL DAILY
Qty: 3 | Refills: 3 | Status: ACTIVE | COMMUNITY
Start: 2025-08-11 | End: 1900-01-01

## 2025-08-17 LAB
ALBUMIN SERPL ELPH-MCNC: 4.4 G/DL
ALP BLD-CCNC: 65 U/L
ALT SERPL-CCNC: 11 U/L
ANION GAP SERPL CALC-SCNC: 13 MMOL/L
AST SERPL-CCNC: 17 U/L
BASOPHILS # BLD AUTO: 0.06 K/UL
BASOPHILS NFR BLD AUTO: 0.7 %
BILIRUB SERPL-MCNC: 0.4 MG/DL
BUN SERPL-MCNC: 12 MG/DL
C TRACH RRNA SPEC QL NAA+PROBE: NOT DETECTED
CALCIUM SERPL-MCNC: 9.1 MG/DL
CHLORIDE SERPL-SCNC: 104 MMOL/L
CO2 SERPL-SCNC: 22 MMOL/L
CREAT SERPL-MCNC: 0.86 MG/DL
CYTOLOGY CVX/VAG DOC THIN PREP: NORMAL
EGFRCR SERPLBLD CKD-EPI 2021: 93 ML/MIN/1.73M2
EOSINOPHIL # BLD AUTO: 0.11 K/UL
EOSINOPHIL NFR BLD AUTO: 1.2 %
ESTIMATED AVERAGE GLUCOSE: 108 MG/DL
GLUCOSE SERPL-MCNC: 91 MG/DL
HBA1C MFR BLD HPLC: 5.4 %
HCT VFR BLD CALC: 41 %
HGB BLD-MCNC: 12.6 G/DL
HPV 16 E6+E7 MRNA CVX QL NAA+PROBE: NOT DETECTED
HPV HIGH+LOW RISK DNA PNL CVX: NOT DETECTED
HPV18+45 E6+E7 MRNA CVX QL NAA+PROBE: NOT DETECTED
IMM GRANULOCYTES NFR BLD AUTO: 0.2 %
LYMPHOCYTES # BLD AUTO: 3.16 K/UL
LYMPHOCYTES NFR BLD AUTO: 35.3 %
MAN DIFF?: NORMAL
MCHC RBC-ENTMCNC: 27.4 PG
MCHC RBC-ENTMCNC: 30.7 G/DL
MCV RBC AUTO: 89.1 FL
MONOCYTES # BLD AUTO: 0.83 K/UL
MONOCYTES NFR BLD AUTO: 9.3 %
N GONORRHOEA RRNA SPEC QL NAA+PROBE: NOT DETECTED
NEUTROPHILS # BLD AUTO: 4.76 K/UL
NEUTROPHILS NFR BLD AUTO: 53.3 %
PLATELET # BLD AUTO: 335 K/UL
POTASSIUM SERPL-SCNC: 4.9 MMOL/L
PROT SERPL-MCNC: 7.6 G/DL
RBC # BLD: 4.6 M/UL
RBC # FLD: 13 %
SODIUM SERPL-SCNC: 138 MMOL/L
SOURCE AMPLIFICATION: NORMAL
WBC # FLD AUTO: 8.94 K/UL

## 2025-08-18 LAB
T4 FREE SERPL-MCNC: 1.9 NG/DL
TSH SERPL-ACNC: 0.18 UIU/ML

## 2025-08-22 ENCOUNTER — OUTPATIENT (OUTPATIENT)
Dept: OUTPATIENT SERVICES | Facility: HOSPITAL | Age: 30
LOS: 1 days | End: 2025-08-22
Payer: COMMERCIAL

## 2025-08-22 ENCOUNTER — APPOINTMENT (OUTPATIENT)
Dept: ULTRASOUND IMAGING | Facility: IMAGING CENTER | Age: 30
End: 2025-08-22
Payer: COMMERCIAL

## 2025-08-22 DIAGNOSIS — R94.6 ABNORMAL RESULTS OF THYROID FUNCTION STUDIES: ICD-10-CM

## 2025-08-22 PROCEDURE — 76536 US EXAM OF HEAD AND NECK: CPT | Mod: 26

## 2025-08-22 PROCEDURE — 76536 US EXAM OF HEAD AND NECK: CPT
